# Patient Record
Sex: MALE | Race: WHITE | NOT HISPANIC OR LATINO | Employment: UNEMPLOYED | URBAN - METROPOLITAN AREA
[De-identification: names, ages, dates, MRNs, and addresses within clinical notes are randomized per-mention and may not be internally consistent; named-entity substitution may affect disease eponyms.]

---

## 2017-01-20 ENCOUNTER — OFFICE VISIT (OUTPATIENT)
Dept: FAMILY MEDICINE CLINIC | Facility: CLINIC | Age: 42
End: 2017-01-20

## 2017-01-20 VITALS
TEMPERATURE: 98 F | BODY MASS INDEX: 26.49 KG/M2 | DIASTOLIC BLOOD PRESSURE: 74 MMHG | OXYGEN SATURATION: 98 % | HEART RATE: 87 BPM | WEIGHT: 189.2 LBS | SYSTOLIC BLOOD PRESSURE: 124 MMHG | HEIGHT: 71 IN

## 2017-01-20 DIAGNOSIS — K21.9 GASTROESOPHAGEAL REFLUX DISEASE, ESOPHAGITIS PRESENCE NOT SPECIFIED: Primary | ICD-10-CM

## 2017-01-20 DIAGNOSIS — R07.9 CHEST PAIN, UNSPECIFIED TYPE: ICD-10-CM

## 2017-01-20 PROCEDURE — 93000 ELECTROCARDIOGRAM COMPLETE: CPT | Performed by: NURSE PRACTITIONER

## 2017-01-20 PROCEDURE — 99213 OFFICE O/P EST LOW 20 MIN: CPT | Performed by: NURSE PRACTITIONER

## 2017-01-20 RX ORDER — PANTOPRAZOLE SODIUM 40 MG/1
40 TABLET, DELAYED RELEASE ORAL DAILY
Qty: 30 TABLET | Refills: 2 | Status: SHIPPED | OUTPATIENT
Start: 2017-01-20 | End: 2018-01-16 | Stop reason: SDUPTHER

## 2017-01-20 NOTE — MR AVS SNAPSHOT
Raimundo Allison   1/20/2017 1:40 PM   Office Visit    Dept Phone:  [phone]   Encounter #:  [id number]    Provider:  WING Hansen   Department:  [facility] INTERNAL MEDICINE                Your Full Care Plan              Today's Medication Changes          These changes are accurate as of: 1/20/17  2:19 PM.  If you have any questions, ask your nurse or doctor.               New Medication(s)Ordered:     pantoprazole 40 MG EC tablet   Commonly known as:  PROTONIX   Take 1 tablet by mouth Daily.   Started by:  WING Hansen         Stop taking medication(s)listed here:     esomeprazole 20 MG capsule   Commonly known as:  nexIUM   Stopped by:  WING Hansen                Where to Get Your Medications      These medications were sent to [facility] [id number] - [facility] [id number] [place] [address] [address] [facility] [address] & [facility] [address] - [phone]  - [phone]   [address] [facility] [address], [facility] [address]     Phone:  [phone]     pantoprazole 40 MG EC tablet                  Your Updated Medication List          This list is accurate as of: 1/20/17  2:19 PM.  Always use your most recent med list.                atorvastatin 10 MG tablet   Commonly known as:  LIPITOR   Take 1 tablet by mouth Daily.       pantoprazole 40 MG EC tablet   Commonly known as:  PROTONIX   Take 1 tablet by mouth Daily.               You Were Diagnosed With        Codes Comments    Gastroesophageal reflux disease, esophagitis presence not specified    -  Primary ICD-10-CM: K21.9  ICD-9-CM: 530.81       Instructions     None    Patient Instructions History      Upcoming Appointments     Visit Type Date Time Department    OFFICE VISIT 1/20/2017  1:40 PM [facility] [facility]    [facility] 5/25/2017 12:00 PM [place]    OFFICE VISIT 6/1/2017  2:00 PM [facility]      MyChart Signup     Our records indicate that you have declined [facility]  "Health MyChart signup. If you would like to sign up for [facility], please email [email or web address] or call [phone] to obtain an activation code.             Other Info from Your Visit           Your Appointments     May 25, 2017 12:00 PM EDT   [facility] with LABJA KGTERRENCE   [facility] INTERNAL MEDICINE (--)    [address] [place], [place] [id number]   [phone]            Jun 01, 2017  2:00 PM EDT   Office Visit with Bart Torres MD   [facility] INTERNAL MEDICINE (--)    [address] [place], [place] [address]   [phone]           Arrive 15 minutes prior to appointment.              Allergies     No Known Drug Allergy        Reason for Visit     Chest Pain and pressure in center of chest. Has previously had this problem      Vital Signs     Blood Pressure Pulse Temperature Height Weight Oxygen Saturation    124/74 (BP Location: Left arm, Patient Position: Sitting, Cuff Size: Adult) 87 98 °F (36.7 °C) (Oral) 71\" (180.3 cm) 189 lb 3.2 oz (85.8 kg) 98%    Body Mass Index Smoking Status                26.39 kg/m2 Former Smoker          Problems and Diagnoses Noted     Acid reflux disease        "

## 2017-01-20 NOTE — PROGRESS NOTES
Subjective   Raimundo Allison is a 42 y.o. male who presents today for:    Chest Pain (and pressure in center of chest. Has previously had this problem)    HPI Comments: Mr. Allison presents today with c/o pain in the upper left center of his chest x 2 weeks. States he has had a history of this same pain off and on for several years. He was worked up in the ER 6 months ago for this pain and cardiac issues were ruled out. He denies shortness of breath, palpitation, cough, injury to his chest or URI symptoms. He also had a lower GI work up last September. He does have a history of GERD and is taking Nexium 20 mg daily. He is concerned that there is something wrong with his lungs because he noticed he had this pain while he was smoking. The pain did resolve after he quit smoking and now it is back.      I have reviewed the patient's medical history in detail and updated the computerized patient record.    Mr. Allison  reports that he has quit smoking. His smoking use included Cigars and Cigarettes. He has never used smokeless tobacco. He reports that he drinks about 7.2 oz of alcohol per week  He reports that he does not use illicit drugs.         Current Outpatient Prescriptions:   •  atorvastatin (LIPITOR) 10 MG tablet, Take 1 tablet by mouth Daily., Disp: 90 tablet, Rfl: 3  •  pantoprazole (PROTONIX) 40 MG EC tablet, Take 1 tablet by mouth Daily., Disp: 30 tablet, Rfl: 2      The following portions of the patient's history were reviewed and updated as appropriate: allergies, current medications, past social history and problem list.    Review of Systems   Constitutional: Negative.    Respiratory: Negative.    Cardiovascular: Positive for chest pain (see HPI).   Gastrointestinal: Negative.  Negative for nausea.   Musculoskeletal: Negative.    Skin: Negative.    Neurological: Negative.          Objective   Vitals:    01/20/17 1338   BP: 124/74   BP Location: Left arm   Patient Position: Sitting   Cuff Size: Adult   Pulse:  "87   Temp: 98 °F (36.7 °C)   TempSrc: Oral   SpO2: 98%   Weight: 189 lb 3.2 oz (85.8 kg)   Height: 71\" (180.3 cm)     Physical Exam   Constitutional: He is oriented to person, place, and time. He appears well-developed and well-nourished.   Cardiovascular: Normal rate, regular rhythm, normal heart sounds and intact distal pulses.    Pulmonary/Chest: Effort normal and breath sounds normal. No respiratory distress. He has no wheezes. He exhibits tenderness (located at the 4th to 5th intercostal space. around the sternal angle).   Neurological: He is alert and oriented to person, place, and time.   Skin: Skin is warm and dry.   Psychiatric:   No acute distress   Vitals reviewed.        Assessment/Plan   Raimundo was seen today for chest pain.    Diagnoses and all orders for this visit:    Gastroesophageal reflux disease, esophagitis presence not specified  -     pantoprazole (PROTONIX) 40 MG EC tablet; Take 1 tablet by mouth Daily.    Chest pain, unspecified type  -     ECG 12 Lead    1. I have reviewed his EKG tracing which shows normal sinus rhythm. I have no other EKG tracings to compare it to.   2. I have discontinue the Nexium and started Pantoprazole 40 mg daily.   3. He is to follow up as needed.  "

## 2017-01-25 ENCOUNTER — OFFICE VISIT (OUTPATIENT)
Dept: FAMILY MEDICINE CLINIC | Facility: CLINIC | Age: 42
End: 2017-01-25

## 2017-01-25 VITALS
HEART RATE: 83 BPM | DIASTOLIC BLOOD PRESSURE: 82 MMHG | OXYGEN SATURATION: 99 % | WEIGHT: 190 LBS | SYSTOLIC BLOOD PRESSURE: 116 MMHG | HEIGHT: 71 IN | BODY MASS INDEX: 26.6 KG/M2

## 2017-01-25 DIAGNOSIS — N41.0 ACUTE PROSTATITIS: ICD-10-CM

## 2017-01-25 DIAGNOSIS — K21.9 GASTROESOPHAGEAL REFLUX DISEASE, ESOPHAGITIS PRESENCE NOT SPECIFIED: Primary | ICD-10-CM

## 2017-01-25 DIAGNOSIS — R19.4 CHANGE IN BOWEL HABITS: ICD-10-CM

## 2017-01-25 PROCEDURE — 99214 OFFICE O/P EST MOD 30 MIN: CPT | Performed by: INTERNAL MEDICINE

## 2017-01-25 RX ORDER — CIPROFLOXACIN 500 MG/1
500 TABLET, FILM COATED ORAL 2 TIMES DAILY
Qty: 28 TABLET | Refills: 0 | Status: SHIPPED | OUTPATIENT
Start: 2017-01-25 | End: 2017-04-12

## 2017-01-26 LAB
ALBUMIN SERPL-MCNC: 4.8 G/DL (ref 3.5–5.2)
ALBUMIN/GLOB SERPL: 1.7 G/DL
ALP SERPL-CCNC: 86 U/L (ref 39–117)
ALT SERPL-CCNC: 35 U/L (ref 1–41)
APPEARANCE UR: CLEAR
AST SERPL-CCNC: 27 U/L (ref 1–40)
BACTERIA #/AREA URNS HPF: NORMAL /HPF
BASOPHILS # BLD AUTO: 0.05 10*3/MM3 (ref 0–0.2)
BASOPHILS NFR BLD AUTO: 0.7 % (ref 0–1.5)
BILIRUB SERPL-MCNC: 0.6 MG/DL (ref 0.1–1.2)
BILIRUB UR QL STRIP: NEGATIVE
BUN SERPL-MCNC: 11 MG/DL (ref 6–20)
BUN/CREAT SERPL: 10.8 (ref 7–25)
CALCIUM SERPL-MCNC: 9.9 MG/DL (ref 8.6–10.5)
CHLORIDE SERPL-SCNC: 102 MMOL/L (ref 98–107)
CO2 SERPL-SCNC: 27.3 MMOL/L (ref 22–29)
COLOR UR: YELLOW
CREAT SERPL-MCNC: 1.02 MG/DL (ref 0.76–1.27)
ENDOMYSIUM IGA SER QL: NEGATIVE
EOSINOPHIL # BLD AUTO: 0.48 10*3/MM3 (ref 0–0.7)
EOSINOPHIL NFR BLD AUTO: 6.8 % (ref 0.3–6.2)
EPI CELLS #/AREA URNS HPF: NORMAL /HPF
ERYTHROCYTE [DISTWIDTH] IN BLOOD BY AUTOMATED COUNT: 12.5 % (ref 11.5–14.5)
GLIADIN PEPTIDE IGA SER-ACNC: 4 UNITS (ref 0–19)
GLIADIN PEPTIDE IGG SER-ACNC: 2 UNITS (ref 0–19)
GLOBULIN SER CALC-MCNC: 2.8 GM/DL
GLUCOSE SERPL-MCNC: 107 MG/DL (ref 65–99)
GLUCOSE UR QL: NEGATIVE
H PYLORI IGG SER IA-ACNC: <0.9 U/ML (ref 0–0.8)
HCT VFR BLD AUTO: 47.1 % (ref 40.4–52.2)
HGB BLD-MCNC: 15.3 G/DL (ref 13.7–17.6)
HGB UR QL STRIP: NEGATIVE
IGA SERPL-MCNC: 178 MG/DL (ref 90–386)
IMM GRANULOCYTES # BLD: 0 10*3/MM3 (ref 0–0.03)
IMM GRANULOCYTES NFR BLD: 0 % (ref 0–0.5)
IRON SATN MFR SERPL: 30 % (ref 20–50)
IRON SERPL-MCNC: 119 MCG/DL (ref 59–158)
KETONES UR QL STRIP: NEGATIVE
LEUKOCYTE ESTERASE UR QL STRIP: NEGATIVE
LYMPHOCYTES # BLD AUTO: 1.92 10*3/MM3 (ref 0.9–4.8)
LYMPHOCYTES NFR BLD AUTO: 27.3 % (ref 19.6–45.3)
MCH RBC QN AUTO: 30.1 PG (ref 27–32.7)
MCHC RBC AUTO-ENTMCNC: 32.5 G/DL (ref 32.6–36.4)
MCV RBC AUTO: 92.5 FL (ref 79.8–96.2)
MICRO URNS: NORMAL
MICRO URNS: NORMAL
MONOCYTES # BLD AUTO: 0.6 10*3/MM3 (ref 0.2–1.2)
MONOCYTES NFR BLD AUTO: 8.5 % (ref 5–12)
MUCOUS THREADS URNS QL MICRO: PRESENT /HPF
NEUTROPHILS # BLD AUTO: 3.98 10*3/MM3 (ref 1.9–8.1)
NEUTROPHILS NFR BLD AUTO: 56.7 % (ref 42.7–76)
NITRITE UR QL STRIP: NEGATIVE
NRBC BLD AUTO-RTO: 0 /100 WBC (ref 0–0)
PH UR STRIP: 7.5 [PH] (ref 5–7.5)
PLATELET # BLD AUTO: 330 10*3/MM3 (ref 140–500)
POTASSIUM SERPL-SCNC: 4.5 MMOL/L (ref 3.5–5.2)
PROT SERPL-MCNC: 7.6 G/DL (ref 6–8.5)
PROT UR QL STRIP: NEGATIVE
PSA SERPL-MCNC: 0.67 NG/ML (ref 0–4)
RBC # BLD AUTO: 5.09 10*6/MM3 (ref 4.6–6)
RBC #/AREA URNS HPF: NORMAL /HPF
SODIUM SERPL-SCNC: 142 MMOL/L (ref 136–145)
SP GR UR: 1.01 (ref 1–1.03)
TIBC SERPL-MCNC: 400 MCG/DL
TSH SERPL DL<=0.005 MIU/L-ACNC: 0.97 MIU/ML (ref 0.27–4.2)
TTG IGA SER-ACNC: <2 U/ML (ref 0–3)
TTG IGG SER-ACNC: <2 U/ML (ref 0–5)
UIBC SERPL-MCNC: 281 MCG/DL
URINALYSIS REFLEX: NORMAL
UROBILINOGEN UR STRIP-MCNC: 0.2 MG/DL (ref 0.2–1)
VIT B12 SERPL-MCNC: 550 PG/ML (ref 211–946)
WBC # BLD AUTO: 7.03 10*3/MM3 (ref 4.5–10.7)
WBC #/AREA URNS HPF: NORMAL /HPF

## 2017-01-27 LAB
C TRACH RRNA SPEC QL NAA+PROBE: NEGATIVE
N GONORRHOEA RRNA SPEC QL NAA+PROBE: NEGATIVE

## 2017-04-12 ENCOUNTER — OFFICE VISIT (OUTPATIENT)
Dept: FAMILY MEDICINE CLINIC | Facility: CLINIC | Age: 42
End: 2017-04-12

## 2017-04-12 VITALS
HEART RATE: 79 BPM | DIASTOLIC BLOOD PRESSURE: 68 MMHG | OXYGEN SATURATION: 98 % | BODY MASS INDEX: 26.32 KG/M2 | HEIGHT: 71 IN | TEMPERATURE: 97.8 F | SYSTOLIC BLOOD PRESSURE: 110 MMHG | WEIGHT: 188 LBS

## 2017-04-12 DIAGNOSIS — R07.81 RIB PAIN ON RIGHT SIDE: Primary | ICD-10-CM

## 2017-04-12 PROCEDURE — 96372 THER/PROPH/DIAG INJ SC/IM: CPT | Performed by: NURSE PRACTITIONER

## 2017-04-12 PROCEDURE — 99213 OFFICE O/P EST LOW 20 MIN: CPT | Performed by: NURSE PRACTITIONER

## 2017-04-12 RX ORDER — KETOROLAC TROMETHAMINE 10 MG/1
10 TABLET, FILM COATED ORAL EVERY 6 HOURS PRN
Qty: 40 TABLET | Refills: 1 | Status: SHIPPED | OUTPATIENT
Start: 2017-04-12 | End: 2017-10-02

## 2017-04-12 NOTE — PROGRESS NOTES
"Subjective   Raimundo Allison is a 42 y.o. male who presents today for:    Rib Injury (2 days ago leaning over pool and heard something snap)    HPI Comments: Mr. Allison presents today because he pulled the muscles/cartilage on the right side of his ribs 2 days ago. He states he was working on his pool and when he reached over the side of the pool to  an object he heard a \"pop\". He denies shortness of breath. States he cannot take a deep breath without pain and he cannot sleep at night because of the pain. He has been taking Tylenol for the pain. He rates the pain as a 8/10 at times.         Mr. Allison  reports that he has quit smoking. His smoking use included Cigars and Cigarettes. He has never used smokeless tobacco. He reports that he drinks about 7.2 oz of alcohol per week  He reports that he does not use illicit drugs.         Current Outpatient Prescriptions:   •  atorvastatin (LIPITOR) 10 MG tablet, Take 1 tablet by mouth Daily., Disp: 90 tablet, Rfl: 3  •  pantoprazole (PROTONIX) 40 MG EC tablet, Take 1 tablet by mouth Daily., Disp: 30 tablet, Rfl: 2      The following portions of the patient's history were reviewed and updated as appropriate: allergies, current medications, past social history and problem list.    Review of Systems   Constitutional: Negative.    Respiratory: Negative for cough, chest tightness, shortness of breath, wheezing and stridor.         Pain with deep inspiration   Cardiovascular: Negative.    Musculoskeletal:        Right rib pain   Skin: Negative.    Neurological: Negative.    Hematological: Negative.          Objective   Vitals:    04/12/17 1531   BP: 110/68   BP Location: Left arm   Patient Position: Sitting   Cuff Size: Adult   Pulse: 79   Temp: 97.8 °F (36.6 °C)   TempSrc: Oral   SpO2: 98%   Weight: 188 lb (85.3 kg)   Height: 71\" (180.3 cm)     Physical Exam   Constitutional: He appears well-developed and well-nourished.   Pulmonary/Chest: Effort normal and breath sounds " normal. No respiratory distress. He has no wheezes. He has no rales. He exhibits tenderness (below right breast and to the side of his chest).   Musculoskeletal: He exhibits tenderness (over right ribs). He exhibits no edema.   Skin: Skin is warm and dry.   Psychiatric:   No acute distress   Vitals reviewed.        Assessment/Plan   Raimundo was seen today for rib injury.    Diagnoses and all orders for this visit:    Rib pain on right side  -     ketorolac (TORADOL) 10 MG tablet; Take 1 tablet by mouth Every 6 (Six) Hours As Needed for Moderate Pain (4-6).  -     ketorolac (TORADOL) injection 30 mg; Inject 1 mL into the shoulder, thigh, or buttocks 1 (One) Time.    Patient has receive Ketorolac 30 mg IM in the office today. He is to start Ketorolac 10 mg po every 6 hours as needed for pain. He has been instructed to go to the ER if his pain worsens or if he is having difficulty breathing. He has been instructed that this kind of rib pain could take weeks to resolve. He is to follow up as needed.

## 2017-04-27 ENCOUNTER — RESULTS ENCOUNTER (OUTPATIENT)
Dept: FAMILY MEDICINE CLINIC | Facility: CLINIC | Age: 42
End: 2017-04-27

## 2017-04-27 DIAGNOSIS — E78.00 ELEVATED CHOLESTEROL: ICD-10-CM

## 2017-10-02 ENCOUNTER — OFFICE VISIT (OUTPATIENT)
Dept: FAMILY MEDICINE CLINIC | Facility: CLINIC | Age: 42
End: 2017-10-02

## 2017-10-02 VITALS
BODY MASS INDEX: 25.86 KG/M2 | SYSTOLIC BLOOD PRESSURE: 112 MMHG | HEART RATE: 75 BPM | HEIGHT: 71 IN | OXYGEN SATURATION: 98 % | DIASTOLIC BLOOD PRESSURE: 72 MMHG | WEIGHT: 184.7 LBS

## 2017-10-02 DIAGNOSIS — R53.83 FATIGUE, UNSPECIFIED TYPE: ICD-10-CM

## 2017-10-02 DIAGNOSIS — E78.00 ELEVATED CHOLESTEROL: ICD-10-CM

## 2017-10-02 DIAGNOSIS — D68.2: ICD-10-CM

## 2017-10-02 DIAGNOSIS — R07.9 CHEST PAIN, UNSPECIFIED TYPE: Primary | ICD-10-CM

## 2017-10-02 PROCEDURE — 99214 OFFICE O/P EST MOD 30 MIN: CPT | Performed by: INTERNAL MEDICINE

## 2017-10-02 PROCEDURE — 93000 ELECTROCARDIOGRAM COMPLETE: CPT | Performed by: INTERNAL MEDICINE

## 2017-10-02 PROCEDURE — 71020 XR CHEST PA AND LATERAL: CPT | Performed by: INTERNAL MEDICINE

## 2017-10-02 RX ORDER — ATORVASTATIN CALCIUM 10 MG/1
10 TABLET, FILM COATED ORAL DAILY
Qty: 90 TABLET | Refills: 0 | Status: SHIPPED | OUTPATIENT
Start: 2017-10-02 | End: 2018-01-16 | Stop reason: SDUPTHER

## 2017-10-02 RX ORDER — ESOMEPRAZOLE MAGNESIUM 40 MG/1
40 CAPSULE, DELAYED RELEASE ORAL
COMMUNITY
End: 2019-03-12

## 2017-10-02 NOTE — PROGRESS NOTES
"Subjective   Raimundo Allison is a 42 y.o. male who presents today for:    Heaviness in Chest (x 3 weeks); Constipation (Pressure to go but not able to); Shortness of Breath; Fatigue; and Insomnia    History of Present Illness   Anterior chest pain and tenderness have been present for 3 weeks.  He has intermitent left side pain and SOA, not always associated with exertion, but usually worse with exertion.  Onset was relatively acute, without trauma.  He has tried OTC NSAIDs without benefit.    He has a strong family h/o VTE in multiple family members due to Factor V Leiden.  He also has Factor V Leiden mutation but no h/o VTE disease.    Mr. Allison  reports that he has quit smoking. His smoking use included Cigars and Cigarettes. He has never used smokeless tobacco. He reports that he drinks about 7.2 oz of alcohol per week  He reports that he does not use illicit drugs.     Allergies   Allergen Reactions   • No Known Drug Allergy        Current Outpatient Prescriptions:   •  esomeprazole (nexIUM) 40 MG capsule, Take 40 mg by mouth Every Morning Before Breakfast., Disp: , Rfl:   •  pantoprazole (PROTONIX) 40 MG EC tablet, Take 1 tablet by mouth Daily., Disp: 30 tablet, Rfl: 2  •  atorvastatin (LIPITOR) 10 MG tablet, Take 1 tablet by mouth Daily., Disp: 90 tablet, Rfl: 3      Review of Systems   Constitutional: Positive for fatigue. Negative for chills and fever.   Eyes: Negative for visual disturbance.   Respiratory: Positive for shortness of breath.    Cardiovascular: Positive for chest pain. Negative for palpitations and leg swelling.   Gastrointestinal: Negative for abdominal pain.   Hematological: Does not bruise/bleed easily.   Psychiatric/Behavioral: The patient is nervous/anxious.        Objective   Vitals:    10/02/17 1540   BP: 112/72   BP Location: Left arm   Patient Position: Sitting   Cuff Size: Adult   Pulse: 75   SpO2: 98%   Weight: 184 lb 11.2 oz (83.8 kg)   Height: 71\" (180.3 cm)     Physical " Exam  Well-developed, well-nourished, in no acute distress.  No cervical, supraclavicular, or axillary lymphadenopathy.  Clear to auscultation bilaterally with good breath sounds throughout the lung fields. Normal respiratory effort noted.  Regular rate and rhythm. Normal S1 and S2. No S3 or S4. No lift or heave appreciated. No murmur noted.  Abdomen is  nontender.  Lower extremities are free of edema and erythema.  Pedal pulses are full bilaterally. There is no Homans sign. There is no warmth or erythema of the lower extremities.    Assessment/Plan   Raimundo was seen today for heaviness in chest, constipation, shortness of breath, fatigue and insomnia.    Diagnoses and all orders for this visit:    Chest pain, unspecified type  -     ECG 12 Lead  -     XR Chest PA & Lateral  -     D-dimer, Quantitative  -     Comprehensive Metabolic Panel    AC globulin factor V (labile) deficiency  -     D-dimer, Quantitative    Elevated cholesterol  -     atorvastatin (LIPITOR) 10 MG tablet; Take 1 tablet by mouth Daily.    Fatigue, unspecified type  -     TSH Rfx On Abnormal To Free T4  -     CBC & Differential  -     Comprehensive Metabolic Panel    Evaluate as ordered. Chest x-ray shows no active disease, no cardiomegaly, and no effusions. ECG shows sinus rhythm with sinus arrhythmia. Otherwise it is normal. Sinus arrhythmia is new, otherwise it is unchanged from an ECG done 1/10/2017. We will obtain a CT scan of the chest if the d-dimer is elevated.    He'll need to return, fasting, for the cholesterol levels.    We will do additional evaluation for his fatigue as ordered today. Additional recommendations will follow after we see those lab results.

## 2017-10-03 LAB
ALBUMIN SERPL-MCNC: 4.5 G/DL (ref 3.5–5.5)
ALBUMIN/GLOB SERPL: 1.9 {RATIO} (ref 1.2–2.2)
ALP SERPL-CCNC: 74 IU/L (ref 39–117)
ALT SERPL-CCNC: 19 IU/L (ref 0–44)
AST SERPL-CCNC: 24 IU/L (ref 0–40)
BASOPHILS # BLD AUTO: 0 X10E3/UL (ref 0–0.2)
BASOPHILS NFR BLD AUTO: 1 %
BILIRUB SERPL-MCNC: 0.2 MG/DL (ref 0–1.2)
BUN SERPL-MCNC: 15 MG/DL (ref 6–24)
BUN/CREAT SERPL: 14 (ref 9–20)
CALCIUM SERPL-MCNC: 9.1 MG/DL (ref 8.7–10.2)
CHLORIDE SERPL-SCNC: 100 MMOL/L (ref 96–106)
CO2 SERPL-SCNC: 26 MMOL/L (ref 18–29)
CREAT SERPL-MCNC: 1.1 MG/DL (ref 0.76–1.27)
D DIMER PPP FEU-MCNC: 0.64 MG/L FEU (ref 0–0.49)
EOSINOPHIL # BLD AUTO: 0.5 X10E3/UL (ref 0–0.4)
EOSINOPHIL NFR BLD AUTO: 6 %
ERYTHROCYTE [DISTWIDTH] IN BLOOD BY AUTOMATED COUNT: 13 % (ref 12.3–15.4)
GLOBULIN SER CALC-MCNC: 2.4 G/DL (ref 1.5–4.5)
GLUCOSE SERPL-MCNC: 107 MG/DL (ref 65–99)
HCT VFR BLD AUTO: 41.7 % (ref 37.5–51)
HGB BLD-MCNC: 14.4 G/DL (ref 12.6–17.7)
IMM GRANULOCYTES # BLD: 0 X10E3/UL (ref 0–0.1)
IMM GRANULOCYTES NFR BLD: 0 %
LYMPHOCYTES # BLD AUTO: 2.3 X10E3/UL (ref 0.7–3.1)
LYMPHOCYTES NFR BLD AUTO: 29 %
MCH RBC QN AUTO: 30.1 PG (ref 26.6–33)
MCHC RBC AUTO-ENTMCNC: 34.5 G/DL (ref 31.5–35.7)
MCV RBC AUTO: 87 FL (ref 79–97)
MONOCYTES # BLD AUTO: 0.8 X10E3/UL (ref 0.1–0.9)
MONOCYTES NFR BLD AUTO: 10 %
NEUTROPHILS # BLD AUTO: 4.4 X10E3/UL (ref 1.4–7)
NEUTROPHILS NFR BLD AUTO: 54 %
PLATELET # BLD AUTO: 328 X10E3/UL (ref 150–379)
POTASSIUM SERPL-SCNC: 4.6 MMOL/L (ref 3.5–5.2)
PROT SERPL-MCNC: 6.9 G/DL (ref 6–8.5)
RBC # BLD AUTO: 4.79 X10E6/UL (ref 4.14–5.8)
SODIUM SERPL-SCNC: 143 MMOL/L (ref 134–144)
TSH SERPL DL<=0.005 MIU/L-ACNC: 0.88 UIU/ML (ref 0.45–4.5)
WBC # BLD AUTO: 8.1 X10E3/UL (ref 3.4–10.8)

## 2017-10-05 ENCOUNTER — HOSPITAL ENCOUNTER (OUTPATIENT)
Dept: CT IMAGING | Facility: HOSPITAL | Age: 42
Discharge: HOME | End: 2017-10-05
Attending: INTERNAL MEDICINE | Admitting: INTERNAL MEDICINE

## 2017-10-05 DIAGNOSIS — R07.9 CHEST PAIN, UNSPECIFIED TYPE: Primary | ICD-10-CM

## 2017-10-05 DIAGNOSIS — D68.2: ICD-10-CM

## 2017-10-05 PROCEDURE — 71275 CT ANGIOGRAPHY CHEST: CPT

## 2017-10-05 PROCEDURE — 0 IOPAMIDOL PER 1 ML: Performed by: INTERNAL MEDICINE

## 2017-10-05 RX ADMIN — IOPAMIDOL 90 ML: 755 INJECTION, SOLUTION INTRAVENOUS at 12:25

## 2017-10-11 DIAGNOSIS — M94.0 COSTOCHONDRITIS: ICD-10-CM

## 2017-10-13 ENCOUNTER — OFFICE VISIT (OUTPATIENT)
Dept: FAMILY MEDICINE CLINIC | Facility: CLINIC | Age: 42
End: 2017-10-13

## 2017-10-13 VITALS
WEIGHT: 187 LBS | OXYGEN SATURATION: 98 % | HEIGHT: 71 IN | BODY MASS INDEX: 26.18 KG/M2 | SYSTOLIC BLOOD PRESSURE: 126 MMHG | DIASTOLIC BLOOD PRESSURE: 74 MMHG | HEART RATE: 86 BPM

## 2017-10-13 DIAGNOSIS — Z00.00 ROUTINE GENERAL MEDICAL EXAMINATION AT HEALTH CARE FACILITY: ICD-10-CM

## 2017-10-13 DIAGNOSIS — K21.9 GASTROESOPHAGEAL REFLUX DISEASE, ESOPHAGITIS PRESENCE NOT SPECIFIED: ICD-10-CM

## 2017-10-13 DIAGNOSIS — M94.0 COSTOCHONDRITIS: Primary | ICD-10-CM

## 2017-10-13 DIAGNOSIS — D68.2: ICD-10-CM

## 2017-10-13 PROBLEM — Z82.49 FAMILY HISTORY OF HEART DISEASE: Status: ACTIVE | Noted: 2017-10-13

## 2017-10-13 PROCEDURE — 99215 OFFICE O/P EST HI 40 MIN: CPT | Performed by: INTERNAL MEDICINE

## 2017-10-13 RX ORDER — MELOXICAM 15 MG/1
15 TABLET ORAL DAILY
Qty: 30 TABLET | Refills: 1 | Status: SHIPPED | OUTPATIENT
Start: 2017-10-13 | End: 2017-11-15

## 2017-10-13 NOTE — PROGRESS NOTES
Subjective   Raimundo Allison is a 42 y.o. male who presents today for:    Chest Pain (Discuss )    History of Present Illness     Relatively acute onset of left-sided chest pain several weeks ago was evaluated with laboratory studies initially showed a mildly elevated d-dimer 0.64).  This was followed by a CT scan which showed no evidence of pulmonary embolus.    There is a strong family history of venous thromboembolic disease attributed to factor V Leiden deficiency.  The patient has tested positive for factor V Leiden, also.  However, he does not know if he is homozygous or heterozygous.  He has had no previous venous thrombosis or pulmonary embolus.    He has been evaluated for similar symptoms in the past and treated with nonsteroidal anti-inflammatories.  Next    He presents with his wife today to discuss recent findings and their concerns.    Mr. Allison  reports that he has quit smoking. His smoking use included Cigars and Cigarettes. He has never used smokeless tobacco. He reports that he drinks about 7.2 oz of alcohol per week  He reports that he does not use illicit drugs.     Allergies   Allergen Reactions   • No Known Drug Allergy        Current Outpatient Prescriptions:   •  atorvastatin (LIPITOR) 10 MG tablet, Take 1 tablet by mouth Daily., Disp: 90 tablet, Rfl: 0  •  esomeprazole (nexIUM) 40 MG capsule, Take 40 mg by mouth Every Morning Before Breakfast., Disp: , Rfl:   •  pantoprazole (PROTONIX) 40 MG EC tablet, Take 1 tablet by mouth Daily., Disp: 30 tablet, Rfl: 2  •  meloxicam (MOBIC) 15 MG tablet, Take 1 tablet by mouth Daily., Disp: 30 tablet, Rfl: 1      Review of Systems  Chest pain persists.  It is worse with movement and occasionally with deep inspiration.  No palpitations.  No nausea or vomiting.  No diaphoresis.  No fevers chills or sweats.  No lower extremity swelling.    Objective   Vitals:    10/13/17 1321   BP: 126/74   BP Location: Right arm   Patient Position: Sitting   Cuff Size:  "Adult   Pulse: 86   SpO2: 98%   Weight: 187 lb (84.8 kg)   Height: 71\" (180.3 cm)     Physical Exam  Well-developed, well-nourished.  In no acute distress.  Normal respiratory effort.  Alert and oriented ×4.  Answers all questions appropriately.    Assessment/Plan   Raimundo was seen today for chest pain.    Diagnoses and all orders for this visit:    Costochondritis  -     meloxicam (MOBIC) 15 MG tablet; Take 1 tablet by mouth Daily.    AC globulin factor V (labile) deficiency    Gastroesophageal reflux disease, esophagitis presence not specified    45 minutes of the 45 minute office visit were spent discussing the results of the labs, the CT scan, prior evaluations, the variable risk of patients with factor V Leiden genetic mutations, and multiple other issues regarding venous thromboembolic disease.  Multiple questions from the patient and his wife were answered over the course of this 45 minute visit.  Every attempt was made to reassure him that no further evaluation was indicated at this time.  At some point, we may want to repeat genetic testing in order to better define his risk for venous thromboembolic disease.    Recommendations made at this time included continued avoidance of cigarettes; aspirin therapy if prolonged immobilization (long road trips or flights, hospitalization, etc.); and notification of other physicians involved in his care should surgical procedures.  Indicated for her any reason.  The increased risk of venous thrombosis in the face of anesthesia was discussed.    The need for continued PPI therapy while we are treating presumed costochondritis with the anti-inflammatory was also discussed.  "

## 2017-10-20 DIAGNOSIS — R07.9 CHEST PAIN, UNSPECIFIED TYPE: Primary | ICD-10-CM

## 2017-11-15 ENCOUNTER — OFFICE VISIT (OUTPATIENT)
Dept: CARDIOLOGY | Facility: CLINIC | Age: 42
End: 2017-11-15

## 2017-11-15 VITALS
HEART RATE: 78 BPM | HEIGHT: 71 IN | BODY MASS INDEX: 26.32 KG/M2 | SYSTOLIC BLOOD PRESSURE: 118 MMHG | DIASTOLIC BLOOD PRESSURE: 76 MMHG | WEIGHT: 188 LBS

## 2017-11-15 DIAGNOSIS — R06.09 DYSPNEA ON EXERTION: ICD-10-CM

## 2017-11-15 DIAGNOSIS — R94.31 ABNORMAL EKG: ICD-10-CM

## 2017-11-15 DIAGNOSIS — R07.9 CHEST PAIN, UNSPECIFIED TYPE: Primary | ICD-10-CM

## 2017-11-15 DIAGNOSIS — Z82.49 FAMILY HISTORY OF HEART DISEASE: ICD-10-CM

## 2017-11-15 DIAGNOSIS — D68.2: ICD-10-CM

## 2017-11-15 PROCEDURE — 93000 ELECTROCARDIOGRAM COMPLETE: CPT | Performed by: INTERNAL MEDICINE

## 2017-11-15 PROCEDURE — 99204 OFFICE O/P NEW MOD 45 MIN: CPT | Performed by: INTERNAL MEDICINE

## 2017-11-15 NOTE — PROGRESS NOTES
Subjective:     Encounter Date:11/15/2017      Patient ID: Raimundo Allison is a 42 y.o. male.    Chief Complaint:  History of Present Illness    This is a 42-year-old male with a history of acid reflux, factor V Leiden, seasonal allergies, who presents for evaluation of chest discomfort and dyspnea on exertion.    The patient reports that about a month ago he had an episode while he was standing in his bedroom watching TV of sudden onset chest heaviness.  The initial episode lasted about 5 minutes and then started to dissipate when it recurred again.  After that he feels like his chest was not felt the same way since.  He has constant discomfort in the left side of his chest radiating to his left axilla and around to his back.  It does not worsen with exertion.  He cannot think of any alleviating factors.  He also reports over the last couple months that he's noticed more dyspnea and fatigue with exertion.  Normal activities that would normally not make him short of breath have made him more out of breath recently.  Due to his history of factor V Leiden deficiency he underwent a d-dimer that was mildly abnormal.  He subsequently underwent a CT angiogram of the chest was performed on 10/5/2017 that showed no evidence of pulmonary embolism, no evidence of aortic aneurysm or dissection, and no acute pulmonary process.  The only comment on the heart was that this size appeared to be within normal limits.  He does report a significant family history of coronary artery disease including his father who had his first MI in his 50s and a paternal grandmother who  of this MI and CHF at the age of 60.  He admits that he has not been very active with routine exercise lately although they did spend a lot of time doing outdoor activities over the summer.    Review of Systems   Constitution: Positive for malaise/fatigue. Negative for weakness.   HENT: Negative for hearing loss, hoarse voice, nosebleeds and sore throat.     Eyes: Negative for pain.   Cardiovascular: Positive for chest pain and dyspnea on exertion. Negative for claudication, cyanosis, irregular heartbeat, leg swelling, near-syncope, orthopnea, palpitations, paroxysmal nocturnal dyspnea and syncope.   Respiratory: Negative for shortness of breath and snoring.    Endocrine: Negative for cold intolerance, heat intolerance, polydipsia, polyphagia and polyuria.   Skin: Negative for itching and rash.   Musculoskeletal: Negative for arthritis, falls, joint pain, joint swelling, muscle cramps, muscle weakness and myalgias.   Gastrointestinal: Negative for constipation, diarrhea, dysphagia, heartburn, hematemesis, hematochezia, melena, nausea and vomiting.   Genitourinary: Negative for frequency, hematuria and hesitancy.   Neurological: Negative for excessive daytime sleepiness, dizziness, headaches, light-headedness and numbness.   Psychiatric/Behavioral: Negative for depression. The patient is not nervous/anxious.           Current Outpatient Prescriptions:   •  atorvastatin (LIPITOR) 10 MG tablet, Take 1 tablet by mouth Daily., Disp: 90 tablet, Rfl: 0  •  esomeprazole (nexIUM) 40 MG capsule, Take 40 mg by mouth Every Morning Before Breakfast., Disp: , Rfl:   •  pantoprazole (PROTONIX) 40 MG EC tablet, Take 1 tablet by mouth Daily., Disp: 30 tablet, Rfl: 2    Past Medical History:   Diagnosis Date   • AC globulin factor V (labile) deficiency    • Chest pain    • Costochondritis    • Factor 5 Leiden mutation, heterozygous    • Family history of heart disease 10/13/2017    Father   • GERD (gastroesophageal reflux disease)    • GERD (gastroesophageal reflux disease)    • High cholesterol    • Hyperlipidemia      Past Surgical History:   Procedure Laterality Date   • COLONOSCOPY N/A 9/23/2016    Procedure: COLONOSCOPYto cecum and ti with hot snare spot ink and 2 clips;  Surgeon: Grey Awan MD;  Location: [facility] ENDOSCOPY;  Service:    • SHOULDER ACROMIOPLASTY Bilateral    •  "SHOULDER ARTHROSCOPY  2015    ALISON Holland MD     Family History   Problem Relation Age of Onset   • Colon polyps Mother    • Factor V Leiden deficiency Father    • Heart attack Father    • Factor V Leiden deficiency Sister    • Factor V Leiden deficiency Brother    • Colon cancer Maternal Uncle      late 50s     Social History   Substance Use Topics   • Smoking status: Former Smoker     Types: Cigars, Cigarettes   • Smokeless tobacco: Never Used   • Alcohol use 7.2 oz/week     12 Standard drinks or equivalent per week           ECG 12 Lead  Date/Time: 11/15/2017 4:46 PM  Performed by: ANA BROOKS  Authorized by: ANA BROOKS   Comparison: compared with previous ECG   Similar to previous ECG  Comparison to previous ECG: Lateral T wave inversions appear to be new  Rhythm: sinus rhythm  Comments: Lateral T wave inversions               Objective:         Visit Vitals   • /76 (BP Location: Right arm, Patient Position: Sitting)  Comment: lft 114 76   • Pulse 78   • Ht 71\" (180.3 cm)   • Wt 188 lb (85.3 kg)   • BMI 26.22 kg/m2          Physical Exam   Constitutional: He is oriented to person, place, and time. He appears well-developed and well-nourished.   HENT:   Head: Normocephalic and atraumatic.   Eyes: Conjunctivae, EOM and lids are normal. Pupils are equal, round, and reactive to light.   Neck: Normal range of motion and full passive range of motion without pain. Neck supple. No JVD present. Carotid bruit is not present.   Cardiovascular: Normal rate, regular rhythm, S1 normal and S2 normal.  Exam reveals no gallop.    No murmur heard.  Pulses:       Radial pulses are 2+ on the right side, and 2+ on the left side.   No bilateral lower extremity edema   Pulmonary/Chest: Effort normal and breath sounds normal.   Abdominal: Soft. Normal appearance.   Lymphadenopathy:     He has no cervical adenopathy.   Neurological: He is alert and oriented to person, place, and time.   Skin: Skin is warm, dry and intact. "   Psychiatric: He has a normal mood and affect.       Lab Review:       Assessment:          Diagnosis Plan   1. Chest pain, unspecified type  Stress Test With Myocardial Perfusion    Adult Transthoracic Echo Complete W/ Cont if Necessary Per Protocol   2. AC globulin factor V (labile) deficiency     3. Family history of heart disease  Stress Test With Myocardial Perfusion    Adult Transthoracic Echo Complete W/ Cont if Necessary Per Protocol   4. Abnormal EKG  Stress Test With Myocardial Perfusion    Adult Transthoracic Echo Complete W/ Cont if Necessary Per Protocol   5. Dyspnea on exertion  Adult Transthoracic Echo Complete W/ Cont if Necessary Per Protocol          Plan:       1.  Chest discomfort.  The symptoms sound somewhat atypical but in light of his family history and his issues with dyspnea on exertion at think we should pursue an ischemic evaluation.  However he does have evidence of T-wave inversions in his lateral leads which appear to be new compared to prior EKG from October.  We'll get her set up with a treadmill Myoview stress test.  2.  Dyspnea on exertion.  This may even be an anginal equivalent.  Again stress test as above.  Also get an echocardiogram to evaluate for any structural heart disease.  3.  Family history of coronary artery disease  5.  Acid reflux    We'll call and discuss results of the stress test and the echocardiogram and determine further workup, management, and follow-up based on those results.

## 2017-11-29 ENCOUNTER — HOSPITAL ENCOUNTER (OUTPATIENT)
Dept: CARDIOLOGY | Facility: HOSPITAL | Age: 42
Discharge: HOME | End: 2017-11-29
Attending: INTERNAL MEDICINE

## 2017-11-29 ENCOUNTER — HOSPITAL ENCOUNTER (OUTPATIENT)
Dept: CARDIOLOGY | Facility: HOSPITAL | Age: 42
Discharge: HOME | End: 2017-11-29
Attending: INTERNAL MEDICINE | Admitting: INTERNAL MEDICINE

## 2017-11-29 VITALS
HEART RATE: 71 BPM | BODY MASS INDEX: 28.49 KG/M2 | HEIGHT: 68 IN | WEIGHT: 188 LBS | SYSTOLIC BLOOD PRESSURE: 118 MMHG | DIASTOLIC BLOOD PRESSURE: 90 MMHG

## 2017-11-29 LAB
ASCENDING AORTA: 3.1 CM
BH CV ECHO MEAS - ACS: 2.2 CM
BH CV ECHO MEAS - AO MAX PG (FULL): 3.1 MMHG
BH CV ECHO MEAS - AO MAX PG: 5.4 MMHG
BH CV ECHO MEAS - AO MEAN PG (FULL): 2 MMHG
BH CV ECHO MEAS - AO MEAN PG: 3.2 MMHG
BH CV ECHO MEAS - AO ROOT AREA (BSA CORRECTED): 1.4
BH CV ECHO MEAS - AO ROOT AREA: 6.7 CM^2
BH CV ECHO MEAS - AO ROOT DIAM: 2.9 CM
BH CV ECHO MEAS - AO V2 MAX: 116.2 CM/SEC
BH CV ECHO MEAS - AO V2 MEAN: 85.3 CM/SEC
BH CV ECHO MEAS - AO V2 VTI: 25.2 CM
BH CV ECHO MEAS - AVA(I,A): 2.4 CM^2
BH CV ECHO MEAS - AVA(I,D): 2.4 CM^2
BH CV ECHO MEAS - AVA(V,A): 2.4 CM^2
BH CV ECHO MEAS - AVA(V,D): 2.4 CM^2
BH CV ECHO MEAS - BSA(HAYCOCK): 2.1 M^2
BH CV ECHO MEAS - BSA: 2.1 M^2
BH CV ECHO MEAS - BZI_BMI: 26.2 KILOGRAMS/M^2
BH CV ECHO MEAS - BZI_METRIC_HEIGHT: 180.3 CM
BH CV ECHO MEAS - BZI_METRIC_WEIGHT: 85.3 KG
BH CV ECHO MEAS - CONTRAST EF (2CH): 58.8 ML/M^2
BH CV ECHO MEAS - CONTRAST EF 4CH: 54.8 ML/M^2
BH CV ECHO MEAS - EDV(MOD-SP2): 131 ML
BH CV ECHO MEAS - EDV(MOD-SP4): 73 ML
BH CV ECHO MEAS - EDV(TEICH): 123.3 ML
BH CV ECHO MEAS - EF(CUBED): 60.8 %
BH CV ECHO MEAS - EF(MOD-SP2): 58.8 %
BH CV ECHO MEAS - EF(MOD-SP4): 57 %
BH CV ECHO MEAS - EF(TEICH): 52 %
BH CV ECHO MEAS - ESV(MOD-SP2): 54 ML
BH CV ECHO MEAS - ESV(MOD-SP4): 33 ML
BH CV ECHO MEAS - ESV(TEICH): 59.1 ML
BH CV ECHO MEAS - FS: 26.8 %
BH CV ECHO MEAS - IVS/LVPW: 0.88
BH CV ECHO MEAS - IVSD: 0.73 CM
BH CV ECHO MEAS - LAT PEAK E' VEL: 5 CM/SEC
BH CV ECHO MEAS - LV DIASTOLIC VOL/BSA (35-75): 35.5 ML/M^2
BH CV ECHO MEAS - LV MASS(C)D: 136 GRAMS
BH CV ECHO MEAS - LV MASS(C)DI: 66.2 GRAMS/M^2
BH CV ECHO MEAS - LV MAX PG: 2.3 MMHG
BH CV ECHO MEAS - LV MEAN PG: 1.2 MMHG
BH CV ECHO MEAS - LV SYSTOLIC VOL/BSA (12-30): 16.1 ML/M^2
BH CV ECHO MEAS - LV V1 MAX: 75.3 CM/SEC
BH CV ECHO MEAS - LV V1 MEAN: 50.3 CM/SEC
BH CV ECHO MEAS - LV V1 VTI: 16.5 CM
BH CV ECHO MEAS - LVIDD: 5.1 CM
BH CV ECHO MEAS - LVIDS: 3.7 CM
BH CV ECHO MEAS - LVLD AP2: 8.6 CM
BH CV ECHO MEAS - LVLD AP4: 7.5 CM
BH CV ECHO MEAS - LVLS AP2: 7.4 CM
BH CV ECHO MEAS - LVLS AP4: 6.4 CM
BH CV ECHO MEAS - LVOT AREA (M): 3.8 CM^2
BH CV ECHO MEAS - LVOT AREA: 3.7 CM^2
BH CV ECHO MEAS - LVOT DIAM: 2.2 CM
BH CV ECHO MEAS - LVPWD: 0.83 CM
BH CV ECHO MEAS - MED PEAK E' VEL: 7 CM/SEC
BH CV ECHO MEAS - MV A DUR: 0.13 SEC
BH CV ECHO MEAS - MV A MAX VEL: 64.5 CM/SEC
BH CV ECHO MEAS - MV DEC SLOPE: 491.5 CM/SEC^2
BH CV ECHO MEAS - MV DEC TIME: 0.14 SEC
BH CV ECHO MEAS - MV E MAX VEL: 66.5 CM/SEC
BH CV ECHO MEAS - MV E/A: 1
BH CV ECHO MEAS - MV MAX PG: 3.5 MMHG
BH CV ECHO MEAS - MV MEAN PG: 1.5 MMHG
BH CV ECHO MEAS - MV P1/2T MAX VEL: 67.4 CM/SEC
BH CV ECHO MEAS - MV P1/2T: 40.2 MSEC
BH CV ECHO MEAS - MV V2 MAX: 93.7 CM/SEC
BH CV ECHO MEAS - MV V2 MEAN: 56.3 CM/SEC
BH CV ECHO MEAS - MV V2 VTI: 30.8 CM
BH CV ECHO MEAS - MVA P1/2T LCG: 3.3 CM^2
BH CV ECHO MEAS - MVA(P1/2T): 5.5 CM^2
BH CV ECHO MEAS - MVA(VTI): 2 CM^2
BH CV ECHO MEAS - PA ACC TIME: 0.11 SEC
BH CV ECHO MEAS - PA MAX PG (FULL): 2.8 MMHG
BH CV ECHO MEAS - PA MAX PG: 3.9 MMHG
BH CV ECHO MEAS - PA PR(ACCEL): 28.3 MMHG
BH CV ECHO MEAS - PA V2 MAX: 99.1 CM/SEC
BH CV ECHO MEAS - PULM A REVS DUR: 0.1 SEC
BH CV ECHO MEAS - PULM A REVS VEL: 25.7 CM/SEC
BH CV ECHO MEAS - PULM DIAS VEL: 45.1 CM/SEC
BH CV ECHO MEAS - PULM S/D: 0.82
BH CV ECHO MEAS - PULM SYS VEL: 36.9 CM/SEC
BH CV ECHO MEAS - PVA(V,A): 1.7 CM^2
BH CV ECHO MEAS - PVA(V,D): 1.7 CM^2
BH CV ECHO MEAS - QP/QS: 0.58
BH CV ECHO MEAS - RAP SYSTOLE: 3 MMHG
BH CV ECHO MEAS - RV MAX PG: 1.1 MMHG
BH CV ECHO MEAS - RV MEAN PG: 0.59 MMHG
BH CV ECHO MEAS - RV V1 MAX: 52.9 CM/SEC
BH CV ECHO MEAS - RV V1 MEAN: 35.4 CM/SEC
BH CV ECHO MEAS - RV V1 VTI: 11 CM
BH CV ECHO MEAS - RVOT AREA: 3.2 CM^2
BH CV ECHO MEAS - RVOT DIAM: 2 CM
BH CV ECHO MEAS - RVSP: 22 MMHG
BH CV ECHO MEAS - SI(AO): 82.2 ML/M^2
BH CV ECHO MEAS - SI(CUBED): 39 ML/M^2
BH CV ECHO MEAS - SI(LVOT): 29.7 ML/M^2
BH CV ECHO MEAS - SI(MOD-SP2): 37.5 ML/M^2
BH CV ECHO MEAS - SI(MOD-SP4): 19.5 ML/M^2
BH CV ECHO MEAS - SI(TEICH): 31.2 ML/M^2
BH CV ECHO MEAS - SV(AO): 168.8 ML
BH CV ECHO MEAS - SV(CUBED): 80.2 ML
BH CV ECHO MEAS - SV(LVOT): 61 ML
BH CV ECHO MEAS - SV(MOD-SP2): 77 ML
BH CV ECHO MEAS - SV(MOD-SP4): 40 ML
BH CV ECHO MEAS - SV(RVOT): 35.4 ML
BH CV ECHO MEAS - SV(TEICH): 64.2 ML
BH CV ECHO MEAS - TAPSE (>1.6): 1.9 CM2
BH CV ECHO MEAS - TR MAX VEL: 215.8 CM/SEC
BH CV NUCLEAR PRIOR STUDY: 2
BH CV STRESS BP STAGE 1: NORMAL
BH CV STRESS BP STAGE 2: NORMAL
BH CV STRESS BP STAGE 3: NORMAL
BH CV STRESS DURATION MIN STAGE 1: 3
BH CV STRESS DURATION MIN STAGE 2: 3
BH CV STRESS DURATION MIN STAGE 3: 3
BH CV STRESS DURATION MIN STAGE 4: 0
BH CV STRESS DURATION SEC STAGE 1: 0
BH CV STRESS DURATION SEC STAGE 2: 0
BH CV STRESS DURATION SEC STAGE 3: 0
BH CV STRESS DURATION SEC STAGE 4: 30
BH CV STRESS GRADE STAGE 1: 10
BH CV STRESS GRADE STAGE 2: 12
BH CV STRESS GRADE STAGE 3: 14
BH CV STRESS GRADE STAGE 4: 16
BH CV STRESS HR STAGE 1: 100
BH CV STRESS HR STAGE 2: 132
BH CV STRESS HR STAGE 3: 161
BH CV STRESS HR STAGE 4: 167
BH CV STRESS METS STAGE 1: 5
BH CV STRESS METS STAGE 2: 7.5
BH CV STRESS METS STAGE 3: 10
BH CV STRESS METS STAGE 4: 13.5
BH CV STRESS PROTOCOL 1: NORMAL
BH CV STRESS RECOVERY BP: NORMAL MMHG
BH CV STRESS RECOVERY HR: 99 BPM
BH CV STRESS SPEED STAGE 1: 1.7
BH CV STRESS SPEED STAGE 2: 2.5
BH CV STRESS SPEED STAGE 3: 3.4
BH CV STRESS SPEED STAGE 4: 4.2
BH CV STRESS STAGE 1: 1
BH CV STRESS STAGE 2: 2
BH CV STRESS STAGE 3: 3
BH CV STRESS STAGE 4: 4
BH CV XLRA - RV BASE: 3.2 CM
BH CV XLRA - TDI S': 11 CM/SEC
E/E' RATIO: 6
LEFT ATRIUM VOLUME INDEX: 26 ML/M2
LV EF NUC BP: 54 %
MAXIMAL PREDICTED HEART RATE: 176 BPM
MAXIMAL PREDICTED HEART RATE: 176 BPM
PERCENT MAX PREDICTED HR: 94.89 %
SINUS: 3.3 CM
STJ: 3.3 CM
STRESS BASELINE BP: NORMAL MMHG
STRESS BASELINE HR: 75 BPM
STRESS PERCENT HR: 112 %
STRESS POST ESTIMATED WORKLOAD: 10 METS
STRESS POST EXERCISE DUR MIN: 9 MIN
STRESS POST EXERCISE DUR SEC: 30 SEC
STRESS POST PEAK BP: NORMAL MMHG
STRESS POST PEAK HR: 167 BPM
STRESS TARGET HR: 150 BPM
STRESS TARGET HR: 150 BPM

## 2017-11-29 PROCEDURE — 93016 CV STRESS TEST SUPVJ ONLY: CPT | Performed by: INTERNAL MEDICINE

## 2017-11-29 PROCEDURE — 78452 HT MUSCLE IMAGE SPECT MULT: CPT

## 2017-11-29 PROCEDURE — 93306 TTE W/DOPPLER COMPLETE: CPT

## 2017-11-29 PROCEDURE — 93017 CV STRESS TEST TRACING ONLY: CPT

## 2017-11-29 PROCEDURE — 78452 HT MUSCLE IMAGE SPECT MULT: CPT | Performed by: INTERNAL MEDICINE

## 2017-11-29 PROCEDURE — 0 TECHNETIUM TETROFOSMIN KIT: Performed by: INTERNAL MEDICINE

## 2017-11-29 PROCEDURE — 93306 TTE W/DOPPLER COMPLETE: CPT | Performed by: INTERNAL MEDICINE

## 2017-11-29 PROCEDURE — A9502 TC99M TETROFOSMIN: HCPCS | Performed by: INTERNAL MEDICINE

## 2017-11-29 PROCEDURE — 93018 CV STRESS TEST I&R ONLY: CPT | Performed by: INTERNAL MEDICINE

## 2017-11-29 RX ADMIN — TETROFOSMIN 1 DOSE: 1.38 INJECTION, POWDER, LYOPHILIZED, FOR SOLUTION INTRAVENOUS at 10:50

## 2017-11-29 RX ADMIN — TETROFOSMIN 1 DOSE: 1.38 INJECTION, POWDER, LYOPHILIZED, FOR SOLUTION INTRAVENOUS at 11:57

## 2018-01-01 ENCOUNTER — RESULTS ENCOUNTER (OUTPATIENT)
Dept: FAMILY MEDICINE CLINIC | Facility: CLINIC | Age: 43
End: 2018-01-01

## 2018-01-01 DIAGNOSIS — Z00.00 ROUTINE GENERAL MEDICAL EXAMINATION AT HEALTH CARE FACILITY: ICD-10-CM

## 2018-01-15 ENCOUNTER — OFFICE VISIT (OUTPATIENT)
Dept: FAMILY MEDICINE CLINIC | Facility: CLINIC | Age: 43
End: 2018-01-15

## 2018-01-15 VITALS
WEIGHT: 192 LBS | BODY MASS INDEX: 29.1 KG/M2 | OXYGEN SATURATION: 99 % | HEART RATE: 91 BPM | DIASTOLIC BLOOD PRESSURE: 86 MMHG | HEIGHT: 68 IN | SYSTOLIC BLOOD PRESSURE: 116 MMHG

## 2018-01-15 DIAGNOSIS — K58.0 IRRITABLE BOWEL SYNDROME WITH DIARRHEA: Primary | ICD-10-CM

## 2018-01-15 DIAGNOSIS — Z23 FLU VACCINE NEED: ICD-10-CM

## 2018-01-15 DIAGNOSIS — R07.9 CHEST PAIN, UNSPECIFIED TYPE: ICD-10-CM

## 2018-01-15 PROCEDURE — 99214 OFFICE O/P EST MOD 30 MIN: CPT | Performed by: INTERNAL MEDICINE

## 2018-01-15 RX ORDER — AMITRIPTYLINE HYDROCHLORIDE 10 MG/1
10 TABLET, FILM COATED ORAL NIGHTLY
Qty: 180 TABLET | Refills: 0 | Status: SHIPPED | OUTPATIENT
Start: 2018-01-15 | End: 2018-01-16 | Stop reason: SDUPTHER

## 2018-01-15 NOTE — PROGRESS NOTES
Chief Complaint   Patient presents with   • Diarrhea     Worsening bowel issues   • Chest Pain     SOB still there     He has chronic bowel problems with a sense of fecal urgency constantly every day.  Sometimes his bowels move frequently (6 times today), and some days the bowels move once or twice, normal consistency.  He has not had any further BRBPR (since the c-scope in 9/2016).    He has a longstanding history of these problems.  He notices an improvement with certain foods, but always has some issues.  He alternates b/t PPIs about every 30 days, with breakthrough symptoms after missing one dose.    Evaluation prior to today's office visit, since becoming my patient, has included the following: Laboratory studies that have shown normal B12 levels, normal iron levels, no H pylori antibodies, no celiac antibodies (1/2017); normal TSH, CBC and CMP in October, 2017.  KUB in 8/2016 was unremarkable.  Images through the upper abdomen done at the time of his CT scans have been unremarkable.  Colonoscopy in 9/2016 revealed a 30 mm tubulovillous adenoma, internal hemorrhoids, but no other abnormalities.  He has not seen his gastroenterologist (Dr. Awan) since the colonoscopy.     ROS: No dysphagia or odynophagia. No urinary symptoms.  No weight loss.  No fever/chills/night sweats.  No arthralgias or myalgias, although he does have chronic chest wall pain.  This manifests as tenderness along the left side of the sternum, reproduced with palpation and by certain movements.    Medical history includes hyperlipidemia; tubulovillous adenoma in 2016; factor V Leiden deficiency.  Evaluation of his chest pain has included multiple CT scans over the last few years to rule out pulmonary embolus; he has never been diagnosed with a PE.  Chest CT scans have been normal otherwise.  He has also undergone cardiovascular evaluation; those results were also normal.        He  reports that he has quit smoking. His smoking use included  Cigars and Cigarettes. He has never used smokeless tobacco. He reports that he drinks about 7.2 oz of alcohol per week  He reports that he does not use illicit drugs.       Allergies   Allergen Reactions   • No Known Drug Allergy        Current Outpatient Prescriptions:   •  esomeprazole (nexIUM) 40 MG capsule, Take 40 mg by mouth Every Morning Before Breakfast., Disp: , Rfl:   •  amitriptyline (ELAVIL) 10 MG tablet, Take 1 tablet by mouth Every Night., Disp: 180 tablet, Rfl: 0  •  atorvastatin (LIPITOR) 10 MG tablet, Take 1 tablet by mouth Daily., Disp: 90 tablet, Rfl: 0  •  pantoprazole (PROTONIX) 40 MG EC tablet, Take 1 tablet by mouth Daily., Disp: 30 tablet, Rfl: 2    EXAM:    Well-developed, well-nourished, in no acute distress.  Sclerae are anicteric and conjunctiva are pink.  No thyromegaly or mass appreciated.  Regular rate and rhythm.  No murmur or rub appreciated.    Mildly tender along the left side of the sternum, at the more superior aspect.  No crepitus appreciated.  Clear to auscultation bilaterally with normal chest excursion, normal respiratory effort, and no adventitious breath sounds.  Abdomen is mildly protuberant, but soft, nondistended, nontender, with normoactive bowel sounds and no evidence of hepatosplenomegaly or mass.  No cervical, supraclavicular, or inguinal lymphadenopathy appreciated.  No lower extremity edema and pedal pulses are full bilaterally.  Rectal exam was deferred.        Raimundo was seen today for constipation and chest pain.    Diagnoses and all orders for this visit:    Irritable bowel syndrome with diarrhea    -     amitriptyline (ELAVIL) 10 MG tablet; Take 1 tablet by mouth Every Night.    Chest wall pain  Elavil may help with the chest wall pain, also.  Eval negative to date.    Flu vaccine need  -     Flu Vaccine Quad PF 3YR+      Evaluation noted above was reviewed at length with the patient today.  No additional evaluation will be pursued today.  Instead, we will  treat for irritable bowel syndrome with the amitriptyline. He will start with 10 mg nightly, increase the dose by 1 tablet every 5-7 days unless side effects prevent it. He will hold it the dose that gives him some relief from the abdominal cramping/pressure and the bowel frequency.  We discussed alternative medications, but settled on the Elavil since it may also help with the chronic chest wall discomfort.    25 minutes of the 30 minute office visit were spent in face-to-face counseling with the patient, reviewing the previous evaluation, reassuring him, answering questions, and discussing treatment options.

## 2018-01-16 DIAGNOSIS — E78.00 ELEVATED CHOLESTEROL: ICD-10-CM

## 2018-01-16 DIAGNOSIS — K21.9 GASTROESOPHAGEAL REFLUX DISEASE, ESOPHAGITIS PRESENCE NOT SPECIFIED: ICD-10-CM

## 2018-01-16 RX ORDER — PANTOPRAZOLE SODIUM 40 MG/1
40 TABLET, DELAYED RELEASE ORAL DAILY
Qty: 30 TABLET | Refills: 0 | Status: SHIPPED | OUTPATIENT
Start: 2018-01-16 | End: 2018-05-28 | Stop reason: SDUPTHER

## 2018-01-16 RX ORDER — PANTOPRAZOLE SODIUM 40 MG/1
40 TABLET, DELAYED RELEASE ORAL DAILY
Qty: 90 TABLET | Refills: 0 | Status: SHIPPED | OUTPATIENT
Start: 2018-01-16 | End: 2018-01-16 | Stop reason: SDUPTHER

## 2018-01-16 RX ORDER — AMITRIPTYLINE HYDROCHLORIDE 10 MG/1
TABLET, FILM COATED ORAL
Qty: 60 TABLET | Refills: 0 | Status: SHIPPED | OUTPATIENT
Start: 2018-01-16 | End: 2018-02-07 | Stop reason: SINTOL

## 2018-01-16 RX ORDER — ATORVASTATIN CALCIUM 10 MG/1
10 TABLET, FILM COATED ORAL DAILY
Qty: 30 TABLET | Refills: 0 | Status: SHIPPED | OUTPATIENT
Start: 2018-01-16 | End: 2018-05-25 | Stop reason: SDUPTHER

## 2018-01-16 RX ORDER — ATORVASTATIN CALCIUM 10 MG/1
10 TABLET, FILM COATED ORAL DAILY
Qty: 90 TABLET | Refills: 0 | Status: SHIPPED | OUTPATIENT
Start: 2018-01-16 | End: 2018-01-16 | Stop reason: SDUPTHER

## 2018-01-22 DIAGNOSIS — K58.0 IRRITABLE BOWEL SYNDROME WITH DIARRHEA: Primary | ICD-10-CM

## 2018-01-22 RX ORDER — HYOSCYAMINE SULFATE 0.12 MG/1
1 TABLET SUBLINGUAL EVERY 8 HOURS PRN
Qty: 60 EACH | Refills: 0 | Status: SHIPPED | OUTPATIENT
Start: 2018-01-22 | End: 2018-02-07 | Stop reason: ALTCHOICE

## 2018-01-31 RX ORDER — AMITRIPTYLINE HYDROCHLORIDE 10 MG/1
TABLET, FILM COATED ORAL
Qty: 180 TABLET | Refills: 0 | OUTPATIENT
Start: 2018-01-31

## 2018-02-07 ENCOUNTER — OFFICE VISIT (OUTPATIENT)
Dept: FAMILY MEDICINE CLINIC | Facility: CLINIC | Age: 43
End: 2018-02-07

## 2018-02-07 VITALS
WEIGHT: 193.9 LBS | HEART RATE: 69 BPM | BODY MASS INDEX: 29.39 KG/M2 | SYSTOLIC BLOOD PRESSURE: 110 MMHG | DIASTOLIC BLOOD PRESSURE: 82 MMHG | OXYGEN SATURATION: 98 % | HEIGHT: 68 IN

## 2018-02-07 DIAGNOSIS — Z81.8: ICD-10-CM

## 2018-02-07 DIAGNOSIS — K58.0 IRRITABLE BOWEL SYNDROME WITH DIARRHEA: Primary | ICD-10-CM

## 2018-02-07 PROCEDURE — 99213 OFFICE O/P EST LOW 20 MIN: CPT | Performed by: INTERNAL MEDICINE

## 2018-02-07 RX ORDER — DICYCLOMINE HYDROCHLORIDE 10 MG/1
10 CAPSULE ORAL
Qty: 120 CAPSULE | Refills: 0 | Status: SHIPPED | OUTPATIENT
Start: 2018-02-07 | End: 2018-03-15

## 2018-02-07 NOTE — PROGRESS NOTES
"Irritable Bowel Syndrome (f/u)    He has chronic bowel problems with a sense of fecal urgency constantly every day.  Sometimes his bowels move frequently (5 times today), and some days the bowels move once or twice, normal consistency.  He has not had any further BRBPR (since the c-scope in 9/2016).     He has a longstanding history of these problems.  He notices an improvement with certain foods, but always has some issues.  He alternates b/t PPIs about every 30 days, with breakthrough symptoms after missing one dose.     Evaluation prior to his 1/15/2018 office visit, since becoming my patient, has included the following: Laboratory studies that have shown normal B12 levels, normal iron levels, no H pylori antibodies, no celiac antibodies (1/2017); normal TSH, CBC and CMP in October, 2017.  KUB in 8/2016 was unremarkable.  Images through the upper abdomen done at the time of his CT scans have been unremarkable.  Colonoscopy in 9/2016 revealed a 30 mm tubulovillous adenoma, internal hemorrhoids, but no other abnormalities.  He has not seen his gastroenterologist (Dr. Awan) since the colonoscopy.    Interval history:  Elavil 10 mg started at the 1/15/18 ov caused him to feel hungover the following morning.  We changed to Levsin with immediate relief initially.  Over the past few days, he has noticed worsening mood lability and irritability.  He calms down quickly after losing his temper.  He has some breakthrough GI symptoms but feels much better (\"60% better\").     ROS: No dysphagia or odynophagia. No urinary symptoms.  No weight loss. No fever/chills/night sweats.  No arthralgias or myalgias, although he does have chronic chest wall pain.  This manifests as tenderness along the left side of the sternum, reproduced with palpation and by certain movements.  Mood swings as noted above.  No SI/HI.  Intermittent headaches.     Medical history includes hyperlipidemia; tubulovillous adenoma in 2016; factor V Leiden " "deficiency.  Evaluation of his chest pain has included multiple CT scans over the last few years to rule out pulmonary embolus; he has never been diagnosed with a PE.  Chest CT scans have been normal otherwise.  He has also undergone cardiovascular evaluation; those results were also normal.    Family history is significant for ADHD in two children; there is no family history of colon cancer or IBD.        Current Outpatient Prescriptions:   •  atorvastatin (LIPITOR) 10 MG tablet, Take 1 tablet by mouth Daily., Disp: 30 tablet, Rfl: 0  •  Hyoscyamine Sulfate SL (LEVSIN/SL) 0.125 MG sublingual tablet, Place 1 tablet under the tongue Every 8 (Eight) Hours As Needed (abdominal pain or nausea)., Disp: 60 each, Rfl: 0  •  pantoprazole (PROTONIX) 40 MG EC tablet, Take 1 tablet by mouth Daily., alternating with:   •  esomeprazole (nexIUM) 40 MG capsule, Take 40 mg by mouth Every Morning Before Breakfast., Disp: , Rfl:     Allergies   Allergen Reactions   • No Known Drug Allergy        /82 (BP Location: Left arm, Patient Position: Sitting, Cuff Size: Adult)  Pulse 69  Ht 172.7 cm (68\")  Wt 88 kg (193 lb 14.4 oz)  SpO2 98%  BMI 29.48 kg/m2      EXAM:  In good spirits.      Raimundo was seen today for irritable bowel syndrome.    Diagnoses and all orders for this visit:    Irritable bowel syndrome with diarrhea    -     dicyclomine (BENTYL) 10 MG capsule; Take 1 capsule by mouth 4 (Four) Times a Day Before Meals & at Bedtime.    We will stop the Levsin for now.  We may return to it depending on how he responds to the Bentyl prescribed for him today.  If he does not feel any better with the 10 mg dose, he should double it and take 20 mg at a time; we may even work up to 40 mg at a time.  Should it cause the emotional side effects he feels Levsin is causing, he will stop it and give us a call.    We will set in motion evaluating him for ADHD.  It's possible that treating an underlying condition like this may also help " with his GI symptoms.    20 minutes of the 20 minute office visit were spent counseling the patient regarding the potential interaction between psychological issues and GI problems.  We discussed various treatment options before settling on the treatment plan above.  Potential side effects of the Bentyl were also discussed with the patient.  We will avoid SNRIs for his possible ADHD because of GI side effects.

## 2018-03-15 ENCOUNTER — OFFICE VISIT (OUTPATIENT)
Dept: FAMILY MEDICINE CLINIC | Facility: CLINIC | Age: 43
End: 2018-03-15

## 2018-03-15 VITALS
HEART RATE: 89 BPM | BODY MASS INDEX: 28.19 KG/M2 | OXYGEN SATURATION: 94 % | SYSTOLIC BLOOD PRESSURE: 100 MMHG | DIASTOLIC BLOOD PRESSURE: 68 MMHG | HEIGHT: 68 IN | WEIGHT: 186 LBS

## 2018-03-15 DIAGNOSIS — K58.0 IRRITABLE BOWEL SYNDROME WITH DIARRHEA: Primary | ICD-10-CM

## 2018-03-15 DIAGNOSIS — N52.2 DRUG-INDUCED ERECTILE DYSFUNCTION: ICD-10-CM

## 2018-03-15 DIAGNOSIS — K21.9 GASTROESOPHAGEAL REFLUX DISEASE, ESOPHAGITIS PRESENCE NOT SPECIFIED: ICD-10-CM

## 2018-03-15 PROCEDURE — 99213 OFFICE O/P EST LOW 20 MIN: CPT | Performed by: INTERNAL MEDICINE

## 2018-03-15 RX ORDER — DICYCLOMINE HCL 20 MG
20 TABLET ORAL 2 TIMES DAILY
Qty: 60 TABLET | Refills: 3 | Status: SHIPPED | OUTPATIENT
Start: 2018-03-15 | End: 2018-05-25 | Stop reason: SDUPTHER

## 2018-03-15 NOTE — PROGRESS NOTES
"Irritable Bowel Syndrome (8 week f/u)    He has chronic bowel problems with a sense of fecal urgency constantly every day.  Sometimes his bowels move frequently (5 times today), and some days the bowels move once or twice, normal consistency.  He has not had any further BRBPR (since the c-scope in 9/2016).    Taking Bentyl 20 mg twice a day (11 AM and 11 PM) with much better control of GI symptoms.  He still feels a sense of fecal urgency, but it is \"80% better\".  He has noticed a decrease in his libido and his ability to obtain/maintain an erection.    Evaluation prior to his 1/15/2018 office visit, since becoming my patient, has included the following: Laboratory studies that have shown normal B12 levels, normal iron levels, no H pylori antibodies, no celiac antibodies (1/2017); normal TSH, CBC and CMP in October, 2017.  KUB in 8/2016 was unremarkable.  Images through the upper abdomen done at the time of his CT scans have been unremarkable.  Colonoscopy in 9/2016 revealed a 30 mm tubulovillous adenoma, internal hemorrhoids, but no other abnormalities.  He has not seen his gastroenterologist (Dr. Awan) since the colonoscopy.    Ride Elavil 1/15/18 but that caused him to feel hung over.  We tried Levsin with initial benefit that waned and caused worsening mood lability/irritability.  GI symptoms were improved (about 60% better) with the Levsin.  Bentyl was started 2/7/18.    ROS: no headaches; no mood changes; mild dry mouth; no urinary issues; no BRBPR.  ED as noted above.    Allergies   Allergen Reactions   • No Known Drug Allergy      He  reports that he has quit smoking. His smoking use included Cigars and Cigarettes. He has never used smokeless tobacco. He reports that he drinks about 7.2 oz of alcohol per week . He reports that he does not use drugs.      Current Outpatient Prescriptions:   •  atorvastatin (LIPITOR) 10 MG tablet, Take 1 tablet by mouth Daily., Disp: 30 tablet, Rfl: 0  •  dicyclomine " "(BENTYL) 10 MG capsule, Take 1 capsule by mouth 4 (Four) Times a Day Before Meals & at Bedtime., Disp: 120 capsule, Rfl: 0  •  esomeprazole (nexIUM) 40 MG capsule, Take 40 mg by mouth Every Morning Before Breakfast., alternating with:    •  pantoprazole (PROTONIX) 40 MG EC tablet, Take 1 tablet by mouth Daily., Disp: 30 tablet, Rfl: 0      /68 (BP Location: Left arm, Patient Position: Sitting, Cuff Size: Adult)   Pulse 89   Ht 172.7 cm (68\")   Wt 84.4 kg (186 lb)   SpO2 94%   BMI 28.28 kg/m²     EXAM:  Anicteric.  Abd soft and NT; NABS.  Affect appropriate.      Raimundo was seen today for irritable bowel syndrome.    Diagnoses and all orders for this visit:    Irritable bowel syndrome with diarrhea  Comments:  Dr. Awan: [phone]  Orders:  -     dicyclomine (BENTYL) 20 MG tablet; Take 1 tablet by mouth 2 (Two) Times a Day.  -     Ambulatory Referral to Gastroenterology    Gastroesophageal reflux disease, esophagitis presence not specified    Drug-induced erectile dysfunction    He will continue with the PPI unchanged.  He will also continue the Bentyl 20 mg twice a day for now.  He will discuss with his gastroenterologist any alternatives that may also help with his GI tract but also eliminate the ED symptoms that have developed presumably as a result of the Bentyl.  If no other options are available, we may try adding a medicine like Viagra to the regimen to see if that helps.  He will contact our office if we need to go down that road.      "

## 2018-04-12 DIAGNOSIS — G44.219 EPISODIC TENSION-TYPE HEADACHE, NOT INTRACTABLE: ICD-10-CM

## 2018-04-12 DIAGNOSIS — D68.2: ICD-10-CM

## 2018-04-12 DIAGNOSIS — E78.00 ELEVATED CHOLESTEROL: Primary | ICD-10-CM

## 2018-05-25 DIAGNOSIS — K21.9 GASTROESOPHAGEAL REFLUX DISEASE, ESOPHAGITIS PRESENCE NOT SPECIFIED: ICD-10-CM

## 2018-05-25 DIAGNOSIS — E78.00 ELEVATED CHOLESTEROL: ICD-10-CM

## 2018-05-25 DIAGNOSIS — K58.0 IRRITABLE BOWEL SYNDROME WITH DIARRHEA: ICD-10-CM

## 2018-05-28 RX ORDER — ATORVASTATIN CALCIUM 10 MG/1
TABLET, FILM COATED ORAL
Qty: 90 TABLET | Refills: 0 | Status: SHIPPED | OUTPATIENT
Start: 2018-05-28 | End: 2018-08-03 | Stop reason: SDUPTHER

## 2018-05-28 RX ORDER — DICYCLOMINE HCL 20 MG
20 TABLET ORAL 2 TIMES DAILY
Qty: 180 TABLET | Refills: 0 | Status: SHIPPED | OUTPATIENT
Start: 2018-05-28 | End: 2018-05-30 | Stop reason: SDUPTHER

## 2018-05-28 RX ORDER — PANTOPRAZOLE SODIUM 40 MG/1
TABLET, DELAYED RELEASE ORAL
Qty: 90 TABLET | Refills: 0 | Status: SHIPPED | OUTPATIENT
Start: 2018-05-28 | End: 2018-08-03 | Stop reason: SDUPTHER

## 2018-05-30 DIAGNOSIS — K58.0 IRRITABLE BOWEL SYNDROME WITH DIARRHEA: ICD-10-CM

## 2018-05-30 RX ORDER — DICYCLOMINE HCL 20 MG
20 TABLET ORAL 2 TIMES DAILY
Qty: 180 TABLET | Refills: 0 | Status: SHIPPED | OUTPATIENT
Start: 2018-05-30 | End: 2018-09-04 | Stop reason: SDUPTHER

## 2018-07-10 ENCOUNTER — OFFICE VISIT (OUTPATIENT)
Dept: FAMILY MEDICINE CLINIC | Facility: CLINIC | Age: 43
End: 2018-07-10

## 2018-07-10 VITALS
SYSTOLIC BLOOD PRESSURE: 122 MMHG | DIASTOLIC BLOOD PRESSURE: 78 MMHG | HEIGHT: 68 IN | BODY MASS INDEX: 28.16 KG/M2 | OXYGEN SATURATION: 97 % | WEIGHT: 185.8 LBS | TEMPERATURE: 98.3 F | HEART RATE: 85 BPM

## 2018-07-10 DIAGNOSIS — J30.1 ACUTE SEASONAL ALLERGIC RHINITIS DUE TO POLLEN: Primary | ICD-10-CM

## 2018-07-10 PROCEDURE — 99213 OFFICE O/P EST LOW 20 MIN: CPT | Performed by: NURSE PRACTITIONER

## 2018-07-10 NOTE — PROGRESS NOTES
Subjective   Raimundo Allison is a 43 y.o. male who presents today for:    Chest Soreness (Since going to [place].  Mother has bronchitis. Mowing grass yesterday has mad this worse) and Cough    Mr. Allison presents today with complaints of a sore chest since yesterday. He also woke up with an itchy throat yesterday. He is concerned that he might have bronchitis because he was recently visiting his mother in [place] and she has bronchitis. He returned form [place] 2 days ago. He also states he was mowing the grass yesterday and he has to take Zyrtec while he mows the grass. He denies fever, chills, body aches, cough, congestion.      I have reviewed the patient's medical history in detail and updated the computerized patient record.      Mr. Allison  reports that he has quit smoking. His smoking use included Cigars and Cigarettes. He has never used smokeless tobacco. He reports that he drinks about 7.2 oz of alcohol per week . He reports that he does not use drugs.     Allergies   Allergen Reactions   • No Known Drug Allergy        Current Outpatient Prescriptions:   •  atorvastatin (LIPITOR) 10 MG tablet, TAKE 1 TABLET DAILY, Disp: 90 tablet, Rfl: 0  •  dicyclomine (BENTYL) 20 MG tablet, Take 1 tablet by mouth 2 (Two) Times a Day., Disp: 180 tablet, Rfl: 0  •  esomeprazole (nexIUM) 40 MG capsule, Take 40 mg by mouth Every Morning Before Breakfast., Disp: , Rfl:   •  pantoprazole (PROTONIX) 40 MG EC tablet, TAKE 1 TABLET DAILY, Disp: 90 tablet, Rfl: 0      Review of Systems   Constitutional: Negative for chills and fever.   HENT: Positive for sneezing. Negative for congestion, postnasal drip, rhinorrhea and sore throat.    Respiratory: Positive for cough (intermittent). Negative for shortness of breath and wheezing.         Chest soreness   Musculoskeletal: Negative for myalgias.         Objective   Vitals:    07/10/18 1451   BP: 122/78   BP Location: Left arm   Patient Position: Sitting   Cuff Size: Adult   Pulse: 85  "  Temp: 98.3 °F (36.8 °C)   TempSrc: Oral   SpO2: 97%   Weight: 84.3 kg (185 lb 12.8 oz)   Height: 172.7 cm (67.99\")     Physical Exam   Constitutional: He is oriented to person, place, and time. He appears well-developed and well-nourished.   HENT:   Right Ear: External ear normal.   Left Ear: External ear normal.   Nose: Nose normal.   Mouth/Throat: Oropharynx is clear and moist.   Cardiovascular: Normal rate, regular rhythm and normal heart sounds.    Pulmonary/Chest: Effort normal and breath sounds normal. He has no wheezes. He has no rales.   Neurological: He is alert and oriented to person, place, and time.   Skin: Skin is warm and dry.   Psychiatric:   No acute distress   Vitals reviewed.            Raimundo was seen today for chest soreness and cough.    Diagnoses and all orders for this visit:    Acute seasonal allergic rhinitis due to pollen    You have been diagnosed with allergic rhinitis. Symptomatic treatment is best.  You may take Mucinex D for relieving congestion and cough.  If you have high blood pressure, do not take Mucinex D, instead opting for plain Mucinex and Coricidin HBP. Oral antihistamine, such as Allegra, Zyrtec or Claritin may help reduce ear pressure and relieve some nasal symptoms.  A saline nasal spray may be used to keep nose clear from discharge.  Be sure that you are increasing your intake of clear to decaffeinated fluids and get plenty of rest.  If your symptoms worsen or persist follow up as needed.  "

## 2018-07-12 RX ORDER — AZITHROMYCIN 250 MG/1
TABLET, FILM COATED ORAL
Qty: 6 TABLET | Refills: 0 | Status: SHIPPED | OUTPATIENT
Start: 2018-07-12 | End: 2018-10-08

## 2018-07-16 ENCOUNTER — OFFICE VISIT (OUTPATIENT)
Dept: GASTROENTEROLOGY | Facility: CLINIC | Age: 43
End: 2018-07-16

## 2018-07-16 VITALS
SYSTOLIC BLOOD PRESSURE: 122 MMHG | BODY MASS INDEX: 25.68 KG/M2 | HEIGHT: 71 IN | DIASTOLIC BLOOD PRESSURE: 70 MMHG | WEIGHT: 183.4 LBS | TEMPERATURE: 97.8 F

## 2018-07-16 DIAGNOSIS — D12.2 ADENOMATOUS POLYP OF ASCENDING COLON: ICD-10-CM

## 2018-07-16 DIAGNOSIS — R14.0 ABDOMINAL BLOATING: Primary | ICD-10-CM

## 2018-07-16 DIAGNOSIS — K58.0 IRRITABLE BOWEL SYNDROME WITH DIARRHEA: ICD-10-CM

## 2018-07-16 PROCEDURE — 99213 OFFICE O/P EST LOW 20 MIN: CPT | Performed by: INTERNAL MEDICINE

## 2018-07-16 NOTE — PROGRESS NOTES
Chief Complaint   Patient presents with   • Irritable Bowel Syndrome   • Heartburn       Raimundo Allison is a  43 y.o. male here for a follow up visit for IBS-D and TVA    Colonoscopy 2 years ago with one 3 cm tubulovillous adenoma removed  Colonoscopy is due September 2019    He has no alarm symptoms that would require repeat colonoscopy at this time      Irritable Bowel Syndrome   This is a chronic problem. The current episode started more than 1 year ago. The problem occurs 2 to 4 times per day. The problem has been waxing and waning. Associated symptoms include abdominal pain, anorexia, a change in bowel habit, fatigue, headaches and nausea. Pertinent negatives include no joint swelling, sore throat, swollen glands or vomiting. Nothing aggravates the symptoms. Treatments tried: He is tried Elavil, Bentyl, Robinul and Levsin and unfortunately had intolerable side effects with each. The treatment provided mild relief.   Heartburn   He complains of abdominal pain and nausea. He reports no sore throat. Associated symptoms include fatigue.       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)    • Bronchitis    • Chest pain    • Costochondritis    • Factor 5 Leiden mutation, heterozygous (CMS/HCC)    • Family history of heart disease 10/13/2017    Father   • GERD (gastroesophageal reflux disease)    • GERD (gastroesophageal reflux disease)    • High cholesterol    • Hyperlipidemia    • Irritable bowel syndrome        Past Surgical History:   Procedure Laterality Date   • COLONOSCOPY N/A 9/23/2016    TVA w/low grade dysplasia, focal ulceration   • SHOULDER ACROMIOPLASTY Bilateral    • SHOULDER ARTHROSCOPY  2015    ALISON Holland MD       Scheduled Meds:    Continuous Infusions:  No current facility-administered medications for this visit.     PRN Meds:.    No Known Allergies    Social History     Social History   • Marital status:      Spouse name: N/A   • Number of  children: N/A   • Years of education: N/A     Occupational History   • Not on file.     Social History Main Topics   • Smoking status: Former Smoker     Types: Cigars, Cigarettes   • Smokeless tobacco: Never Used   • Alcohol use 9.6 oz/week     12 Standard drinks or equivalent, 4 Cans of beer per week      Comment: daily   • Drug use: No   • Sexual activity: Not on file     Other Topics Concern   • Not on file     Social History Narrative   • No narrative on file       Family History   Problem Relation Age of Onset   • Colon polyps Mother    • Factor V Leiden deficiency Father    • Heart attack Father    • Factor V Leiden deficiency Sister    • Factor V Leiden deficiency Brother    • Colon cancer Maternal Uncle         late 50s       Review of Systems   Constitutional: Positive for fatigue.   HENT: Negative for sore throat.    Gastrointestinal: Positive for abdominal pain, anorexia, change in bowel habit and nausea. Negative for vomiting.   Musculoskeletal: Negative for joint swelling.   Neurological: Positive for headaches.   All other systems reviewed and are negative.      Vitals:    07/16/18 1248   BP: 122/70   Temp: 97.8 °F (36.6 °C)       Physical Exam   Constitutional: He is oriented to person, place, and time. He appears well-developed and well-nourished.   HENT:   Head: Normocephalic and atraumatic.   Eyes: Conjunctivae and EOM are normal.   Neck: Normal range of motion. No tracheal deviation present.   Cardiovascular: Normal rate and regular rhythm.    Pulmonary/Chest: Effort normal and breath sounds normal. No respiratory distress.   Abdominal: Soft. Bowel sounds are normal. He exhibits no distension and no mass. There is no tenderness. There is no rebound and no guarding.   Musculoskeletal: Normal range of motion.   Neurological: He is alert and oriented to person, place, and time.   Skin: Skin is warm and dry.   Psychiatric: He has a normal mood and affect. Judgment normal.   Nursing note and vitals  reviewed.      No images are attached to the encounter.    Problem list    Tubulovillous adenoma removed 2 yrs ago  IBS-D, Levsin and tricyclic antidepressant caused dry mouth and dry eyes and he had to stop these      Assessment/Plan    Plan for colonoscopy September 2019  We will try Xifaxan 550 mg by mouth 3 times a day for 14 days as a drug that is now FDA approved for IBS-D  I've given him samples of IBGARD 2 tablets every 12 hours as needed, he can get this over-the-counter if he needs more samples  Options in the future include viberzi for IBS-D  We'll see him back in the office in 8 weeks to check his symptoms after Xifaxan treatment

## 2018-07-17 ENCOUNTER — TELEPHONE (OUTPATIENT)
Dept: GASTROENTEROLOGY | Facility: CLINIC | Age: 43
End: 2018-07-17

## 2018-07-17 NOTE — TELEPHONE ENCOUNTER
----- Message from Suleiman Aguilar sent at 7/17/2018  4:02 PM EDT -----  Regarding: XIFAXAN   Contact: [phone]  PT WIFE CALLED WITH QUESTIONS ABOUT XIFAXAN

## 2018-07-17 NOTE — TELEPHONE ENCOUNTER
Returned phone call, he question if we received the PA paperwork from [facility]. Advised will send an update to Suzi and if she did not get the paperwork she will contact the .

## 2018-07-19 NOTE — TELEPHONE ENCOUNTER
Spoke to [facility] pharmacy. Medication does not need a PA. They have already filled it but the copay is $300. The patient has not picked it up.

## 2018-07-29 ENCOUNTER — RESULTS ENCOUNTER (OUTPATIENT)
Dept: FAMILY MEDICINE CLINIC | Facility: CLINIC | Age: 43
End: 2018-07-29

## 2018-07-29 DIAGNOSIS — D68.2: ICD-10-CM

## 2018-07-29 DIAGNOSIS — E78.00 ELEVATED CHOLESTEROL: ICD-10-CM

## 2018-07-29 DIAGNOSIS — G44.219 EPISODIC TENSION-TYPE HEADACHE, NOT INTRACTABLE: ICD-10-CM

## 2018-08-03 DIAGNOSIS — K21.9 GASTROESOPHAGEAL REFLUX DISEASE, ESOPHAGITIS PRESENCE NOT SPECIFIED: ICD-10-CM

## 2018-08-03 DIAGNOSIS — E78.00 ELEVATED CHOLESTEROL: ICD-10-CM

## 2018-08-06 RX ORDER — ATORVASTATIN CALCIUM 10 MG/1
TABLET, FILM COATED ORAL
Qty: 90 TABLET | Refills: 0 | Status: SHIPPED | OUTPATIENT
Start: 2018-08-06 | End: 2018-11-04 | Stop reason: SDUPTHER

## 2018-08-06 RX ORDER — PANTOPRAZOLE SODIUM 40 MG/1
TABLET, DELAYED RELEASE ORAL
Qty: 90 TABLET | Refills: 0 | Status: SHIPPED | OUTPATIENT
Start: 2018-08-06 | End: 2018-11-04 | Stop reason: SDUPTHER

## 2018-09-04 DIAGNOSIS — K58.0 IRRITABLE BOWEL SYNDROME WITH DIARRHEA: ICD-10-CM

## 2018-09-05 RX ORDER — DICYCLOMINE HCL 20 MG
TABLET ORAL
Qty: 180 TABLET | Refills: 0 | Status: SHIPPED | OUTPATIENT
Start: 2018-09-05 | End: 2018-11-01

## 2018-09-17 ENCOUNTER — TELEPHONE (OUTPATIENT)
Dept: GASTROENTEROLOGY | Facility: CLINIC | Age: 43
End: 2018-09-17

## 2018-09-17 NOTE — TELEPHONE ENCOUNTER
----- Message from Suleiman Aguilar sent at 9/17/2018 10:55 AM EDT -----  Regarding: MED   Contact: [phone]  PT CALLED STATED NEVER GOT THE MEDICATION FOR IBS..

## 2018-09-17 NOTE — TELEPHONE ENCOUNTER
Returned phone call, spoke with patient's wife Barbara. She states the oop cost for Xifaxan was $300 and too expensive. Advised have a 14 day sample of Xifaxan, will place at  for him to  at his convenience. She verb understanding and will relay message. F/u visit with NP scheduled on 10/19.

## 2018-10-08 ENCOUNTER — OFFICE VISIT (OUTPATIENT)
Dept: FAMILY MEDICINE CLINIC | Facility: CLINIC | Age: 43
End: 2018-10-08

## 2018-10-08 VITALS
HEIGHT: 71 IN | SYSTOLIC BLOOD PRESSURE: 104 MMHG | WEIGHT: 186.4 LBS | OXYGEN SATURATION: 98 % | HEART RATE: 106 BPM | BODY MASS INDEX: 26.1 KG/M2 | DIASTOLIC BLOOD PRESSURE: 72 MMHG

## 2018-10-08 DIAGNOSIS — R39.89 ABNORMAL URINE COLOR: Primary | ICD-10-CM

## 2018-10-08 LAB
BILIRUB BLD-MCNC: NEGATIVE MG/DL
CLARITY, POC: CLEAR
COLOR UR: YELLOW
GLUCOSE UR STRIP-MCNC: NEGATIVE MG/DL
KETONES UR QL: NEGATIVE
LEUKOCYTE EST, POC: NEGATIVE
NITRITE UR-MCNC: NEGATIVE MG/ML
PH UR: 6 [PH] (ref 5–8)
PROT UR STRIP-MCNC: NEGATIVE MG/DL
RBC # UR STRIP: NEGATIVE /UL
SP GR UR: 1.01 (ref 1–1.03)
UROBILINOGEN UR QL: NORMAL

## 2018-10-08 PROCEDURE — 99212 OFFICE O/P EST SF 10 MIN: CPT | Performed by: INTERNAL MEDICINE

## 2018-10-08 PROCEDURE — 81003 URINALYSIS AUTO W/O SCOPE: CPT | Performed by: INTERNAL MEDICINE

## 2018-10-08 NOTE — PROGRESS NOTES
"Subjective   Raimundo Allison is a 43 y.o. male who presents today for:    Blood in Urine (x 3 weeks)    History of Present Illness   He was started on Xifaxan for IBS about 3 weeks ago.  He first noticed a deep orange color to his urine a few days into the course, darker in the morning and clearing as the day goes on. No dysuria.  No polyuria.    He denies any h/o kidney stones.      He stopped smoking about 3 years ago, but still uses nicotine lozenges.  He has a 25-30 py h/o tobacco abuse    He denies any h/o bladder and kidney cancer only colon CA in his maternal uncle and polyps in his mother.    No Known Allergies    Current Outpatient Prescriptions:   •  esomeprazole (nexIUM) 40 MG capsule, Take 40 mg by mouth Every Morning Before Breakfast. [Not taking]  •  pantoprazole (PROTONIX) 40 MG EC tablet, TAKE 1 TABLET DAILY  •  atorvastatin (LIPITOR) 10 MG tablet, TAKE 1 TABLET DAILY [Not taking]  •  dicyclomine (BENTYL) 20 MG tablet, TAKE 1 TABLET TWICE A DAY      Review of Systems   Constitutional: Negative for unexpected weight change.   Gastrointestinal: Negative for abdominal distention, abdominal pain, constipation, diarrhea and nausea.   Genitourinary: Negative for dysuria and urgency.   Musculoskeletal: Negative for back pain.   Skin: Negative for rash.         Objective   Vitals:    10/08/18 1159   BP: 104/72   BP Location: Left arm   Patient Position: Sitting   Cuff Size: Adult   Pulse: 106   SpO2: 98%   Weight: 84.6 kg (186 lb 6.4 oz)   Height: 180.3 cm (71\")     Physical Exam  Well-developed, well-nourished, in no acute distress.  Sclerae anicteric and the conjunctivae are pink.  Chest is clear to auscultation bilaterally.  Normal respiratory effort noted.  No abdominal pain with deep inspiration.  Abdomen is soft and nontender.  No CVA tenderness.  No masses or hepatosplenomegaly appreciated.  No suprapubic fullness or tenderness.      Raimundo was seen today for blood in urine.    Diagnoses and all " orders for this visit:    Abnormal urine color  -     POC Urinalysis Dipstick is normal.    Reassured patient that there was no evidence of blood in the urine today.  Unsure why the urine changed color so abruptly with the start of Xifaxan.  Call if new urinary symptoms develop and we will check urinalysis.

## 2018-10-19 ENCOUNTER — OFFICE VISIT (OUTPATIENT)
Dept: GASTROENTEROLOGY | Facility: CLINIC | Age: 43
End: 2018-10-19

## 2018-10-19 VITALS
TEMPERATURE: 97.7 F | BODY MASS INDEX: 26.21 KG/M2 | DIASTOLIC BLOOD PRESSURE: 72 MMHG | HEIGHT: 71 IN | WEIGHT: 187.2 LBS | SYSTOLIC BLOOD PRESSURE: 118 MMHG

## 2018-10-19 DIAGNOSIS — K58.0 IRRITABLE BOWEL SYNDROME WITH DIARRHEA: Primary | ICD-10-CM

## 2018-10-19 DIAGNOSIS — K63.89 SMALL INTESTINAL BACTERIAL OVERGROWTH: ICD-10-CM

## 2018-10-19 DIAGNOSIS — R39.89 ABNORMAL URINE COLOR: Primary | ICD-10-CM

## 2018-10-19 DIAGNOSIS — D12.6 ADENOMATOUS POLYP OF COLON, UNSPECIFIED PART OF COLON: ICD-10-CM

## 2018-10-19 PROCEDURE — 99214 OFFICE O/P EST MOD 30 MIN: CPT | Performed by: NURSE PRACTITIONER

## 2018-10-19 NOTE — PROGRESS NOTES
Chief Complaint   Patient presents with   • Follow-up     xifaxan follow up        Raimundo Allison is a  43 y.o. male here for a follow up visit for IBS.    HPI  42 yo m presents today for follow up visit for IBS-D. He is a patient of DR. Awan. He was last seen here in the office on 7/16/18. He has hx chronic IBS-D. He was started on 1 round of Xifaxan after his last appt here. He admits since taking the 1 round of 14 days of Xifaxan he is feeling much better. Still having the occasional loose stool but its much better. He denies any dysphagia, reflux, abd pain, N&V, diarrhea, constipation, rectal bleeding or melena. He admits his appetite is ok and his weight is stable.     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)    • Bronchitis    • Chest pain    • Costochondritis    • Factor 5 Leiden mutation, heterozygous (CMS/HCC)    • Family history of heart disease 10/13/2017    Father   • GERD (gastroesophageal reflux disease)    • GERD (gastroesophageal reflux disease)    • High cholesterol    • Hyperlipidemia    • Irritable bowel syndrome        Past Surgical History:   Procedure Laterality Date   • COLONOSCOPY N/A 9/23/2016    TVA w/low grade dysplasia, focal ulceration   • SHOULDER ACROMIOPLASTY Bilateral    • SHOULDER ARTHROSCOPY  2015    ALISON Holland MD       Scheduled Meds:    Continuous Infusions:  No current facility-administered medications for this visit.     PRN Meds:.    No Known Allergies    Social History     Social History   • Marital status:      Spouse name: N/A   • Number of children: N/A   • Years of education: N/A     Occupational History   • Not on file.     Social History Main Topics   • Smoking status: Former Smoker     Types: Cigars, Cigarettes   • Smokeless tobacco: Never Used   • Alcohol use 9.6 oz/week     12 Standard drinks or equivalent, 4 Cans of beer per week      Comment: daily   • Drug use: No   • Sexual activity: Not on file      Other Topics Concern   • Not on file     Social History Narrative   • No narrative on file       Family History   Problem Relation Age of Onset   • Colon polyps Mother    • Factor V Leiden deficiency Father    • Heart attack Father    • Factor V Leiden deficiency Sister    • Factor V Leiden deficiency Brother    • Colon cancer Maternal Uncle         late 50s       Review of Systems   Constitutional: Negative for appetite change, chills, diaphoresis, fatigue, fever and unexpected weight change.   HENT: Negative for nosebleeds, postnasal drip, sore throat, trouble swallowing and voice change.    Respiratory: Negative for cough, choking, chest tightness, shortness of breath and wheezing.    Cardiovascular: Negative for chest pain.   Gastrointestinal: Negative for abdominal distention, abdominal pain, anal bleeding, blood in stool, constipation, diarrhea, nausea, rectal pain and vomiting.   Endocrine: Negative for polydipsia, polyphagia and polyuria.   Musculoskeletal: Negative for gait problem.   Skin: Negative for rash and wound.   Allergic/Immunologic: Negative for food allergies.   Neurological: Negative for dizziness, speech difficulty and light-headedness.   Psychiatric/Behavioral: Negative for confusion, self-injury, sleep disturbance and suicidal ideas.       Vitals:    10/19/18 1332   BP: 118/72   Temp: 97.7 °F (36.5 °C)       Physical Exam   Constitutional: He is oriented to person, place, and time. He appears well-developed and well-nourished. He does not appear ill. No distress.   HENT:   Head: Normocephalic.   Eyes: Pupils are equal, round, and reactive to light.   Cardiovascular: Normal rate, regular rhythm and normal heart sounds.    Pulmonary/Chest: Effort normal and breath sounds normal.   Abdominal: Soft. Bowel sounds are normal. He exhibits no distension and no mass. There is no hepatosplenomegaly. There is no tenderness. There is no rebound and no guarding. No hernia.   Musculoskeletal: Normal  range of motion.   Neurological: He is alert and oriented to person, place, and time.   Skin: Skin is warm and dry.   Psychiatric: He has a normal mood and affect. His speech is normal and behavior is normal. Judgment normal.       No images are attached to the encounter.    Assessment & plan    1. Irritable bowel syndrome with diarrhea    2. Small intestinal bacterial overgrowth    3. Adenomatous polyp of colon, unspecified part of colon    IBS-D/SIBO seems much improved after 1 round of Xifaxan. Next colonoscopy will be due in 9/2019. Follow up with me in 2-3 months. Call office with any issues.

## 2018-10-21 ENCOUNTER — RESULTS ENCOUNTER (OUTPATIENT)
Dept: FAMILY MEDICINE CLINIC | Facility: CLINIC | Age: 43
End: 2018-10-21

## 2018-10-21 DIAGNOSIS — R39.89 ABNORMAL URINE COLOR: ICD-10-CM

## 2018-10-28 LAB
ALBUMIN SERPL-MCNC: 4.4 G/DL (ref 3.5–5.2)
ALBUMIN/GLOB SERPL: 1.6 G/DL
ALP SERPL-CCNC: 78 U/L (ref 39–117)
ALT SERPL-CCNC: 20 U/L (ref 1–41)
APPEARANCE UR: CLEAR
AST SERPL-CCNC: 17 U/L (ref 1–40)
BACTERIA UR CULT: NO GROWTH
BACTERIA UR CULT: NORMAL
BASOPHILS # BLD AUTO: 0.06 10*3/MM3 (ref 0–0.2)
BASOPHILS NFR BLD AUTO: 0.9 % (ref 0–1.5)
BILIRUB SERPL-MCNC: 0.4 MG/DL (ref 0.1–1.2)
BILIRUB UR QL STRIP: NEGATIVE
BUN SERPL-MCNC: 11 MG/DL (ref 6–20)
BUN/CREAT SERPL: 11.1 (ref 7–25)
CALCIUM SERPL-MCNC: 9.7 MG/DL (ref 8.6–10.5)
CHLORIDE SERPL-SCNC: 102 MMOL/L (ref 98–107)
CHOLEST SERPL-MCNC: 258 MG/DL (ref 0–200)
CO2 SERPL-SCNC: 29.2 MMOL/L (ref 22–29)
COLOR UR: YELLOW
CREAT SERPL-MCNC: 0.99 MG/DL (ref 0.76–1.27)
EOSINOPHIL # BLD AUTO: 0.43 10*3/MM3 (ref 0–0.7)
EOSINOPHIL NFR BLD AUTO: 6.2 % (ref 0.3–6.2)
ERYTHROCYTE [DISTWIDTH] IN BLOOD BY AUTOMATED COUNT: 12.5 % (ref 11.5–14.5)
GLOBULIN SER CALC-MCNC: 2.8 GM/DL
GLUCOSE SERPL-MCNC: 96 MG/DL (ref 65–99)
GLUCOSE UR QL: NEGATIVE
HCT VFR BLD AUTO: 46 % (ref 40.4–52.2)
HDLC SERPL-MCNC: 53 MG/DL (ref 40–60)
HGB BLD-MCNC: 14.4 G/DL (ref 13.7–17.6)
HGB UR QL STRIP: NEGATIVE
IMM GRANULOCYTES # BLD: 0 10*3/MM3 (ref 0–0.03)
IMM GRANULOCYTES NFR BLD: 0 % (ref 0–0.5)
KETONES UR QL STRIP: NEGATIVE
LDLC SERPL CALC-MCNC: 176 MG/DL (ref 0–100)
LEUKOCYTE ESTERASE UR QL STRIP: NEGATIVE
LYMPHOCYTES # BLD AUTO: 1.83 10*3/MM3 (ref 0.9–4.8)
LYMPHOCYTES NFR BLD AUTO: 26.2 % (ref 19.6–45.3)
MCH RBC QN AUTO: 29.3 PG (ref 27–32.7)
MCHC RBC AUTO-ENTMCNC: 31.3 G/DL (ref 32.6–36.4)
MCV RBC AUTO: 93.7 FL (ref 79.8–96.2)
MONOCYTES # BLD AUTO: 0.59 10*3/MM3 (ref 0.2–1.2)
MONOCYTES NFR BLD AUTO: 8.5 % (ref 5–12)
NEUTROPHILS # BLD AUTO: 4.07 10*3/MM3 (ref 1.9–8.1)
NEUTROPHILS NFR BLD AUTO: 58.2 % (ref 42.7–76)
NITRITE UR QL STRIP: NEGATIVE
PH UR STRIP: 7 [PH] (ref 5–8)
PLATELET # BLD AUTO: 321 10*3/MM3 (ref 140–500)
POTASSIUM SERPL-SCNC: 4.5 MMOL/L (ref 3.5–5.2)
PROT SERPL-MCNC: 7.2 G/DL (ref 6–8.5)
PROT UR QL STRIP: NEGATIVE
RBC # BLD AUTO: 4.91 10*6/MM3 (ref 4.6–6)
SODIUM SERPL-SCNC: 138 MMOL/L (ref 136–145)
SP GR UR: 1.01 (ref 1–1.03)
TRIGL SERPL-MCNC: 146 MG/DL (ref 0–150)
UROBILINOGEN UR STRIP-MCNC: NORMAL MG/DL
VLDLC SERPL CALC-MCNC: 29.2 MG/DL (ref 5–40)
WBC # BLD AUTO: 6.98 10*3/MM3 (ref 4.5–10.7)

## 2018-11-01 ENCOUNTER — OFFICE VISIT (OUTPATIENT)
Dept: FAMILY MEDICINE CLINIC | Facility: CLINIC | Age: 43
End: 2018-11-01

## 2018-11-01 VITALS
DIASTOLIC BLOOD PRESSURE: 82 MMHG | HEIGHT: 71 IN | OXYGEN SATURATION: 97 % | HEART RATE: 79 BPM | BODY MASS INDEX: 26.21 KG/M2 | WEIGHT: 187.2 LBS | SYSTOLIC BLOOD PRESSURE: 116 MMHG

## 2018-11-01 DIAGNOSIS — R10.84 GENERALIZED ABDOMINAL PAIN: Primary | ICD-10-CM

## 2018-11-01 DIAGNOSIS — K21.9 GASTROESOPHAGEAL REFLUX DISEASE, ESOPHAGITIS PRESENCE NOT SPECIFIED: ICD-10-CM

## 2018-11-01 PROCEDURE — 99214 OFFICE O/P EST MOD 30 MIN: CPT | Performed by: INTERNAL MEDICINE

## 2018-11-01 RX ORDER — AMITRIPTYLINE HYDROCHLORIDE 25 MG/1
TABLET, FILM COATED ORAL
Qty: 60 TABLET | Refills: 1 | Status: SHIPPED | OUTPATIENT
Start: 2018-11-01 | End: 2019-06-03

## 2018-11-01 NOTE — PROGRESS NOTES
"Subjective   Raimundo Allison is a 43 y.o. male who presents today for:    Abdominal Pain (Lower abdominal pain x 1 month & review labs)    History of Present Illness     Intermittent LLQ abdominal pain 2 weeks ago has developed into generalized pain  is in the past week.  He carries a diagnosis of IBS, diarrhea predominant, but has felt better since he took a course of Xifaxan.  He now has one bowel movement per day, well formed.  This lower abdominal discomfort is new and has not been evaluated for now.    He still struggles with reflux for which he takes either Nexium or pantoprazole.  He usually cycles between one or the other.      We saw him 10/18/18 for discolored urine.  Evaluation was negative.  No treatment was provided to the time.  He still reports intermittent, bright orange urine however, he only has mild urinary frequency.    Mr. Allison  reports that he has quit smoking. His smoking use included Cigars and Cigarettes. He has never used smokeless tobacco. He reports that he drinks about 9.6 oz of alcohol per week . He reports that he does not use drugs.     No Known Allergies     Current Outpatient Prescriptions:   •  atorvastatin (LIPITOR) 10 MG tablet, TAKE 1 TABLET DAILY, Disp: 90 tablet, Rfl: 0  •  esomeprazole (nexIUM) 40 MG capsule, Take 40 mg by mouth Every Morning Before Breakfast., Disp: , Rfl:   •  pantoprazole (PROTONIX) 40 MG EC tablet, TAKE 1 TABLET DAILY, Disp: 90 tablet, Rfl: 0      Review of Systems   Constitutional: Negative for unexpected weight change.   Gastrointestinal: Negative for constipation and diarrhea.        Reflux, controlled.   Genitourinary: Positive for frequency. Negative for decreased urine volume.   Psychiatric/Behavioral: The patient is nervous/anxious.          Objective   Vitals:    11/01/18 1204   BP: 116/82   BP Location: Left arm   Patient Position: Sitting   Cuff Size: Adult   Pulse: 79   SpO2: 97%   Weight: 84.9 kg (187 lb 3.2 oz)   Height: 180.3 cm (71\") "     Physical Exam   Constitutional: He is oriented to person, place, and time. He appears well-developed and well-nourished. No distress.   Eyes: Conjunctivae are normal. No scleral icterus.   Cardiovascular: Normal rate and regular rhythm.    Pulmonary/Chest: Effort normal and breath sounds normal.   Abdominal: Soft. Bowel sounds are normal. He exhibits no distension, no abdominal bruit and no mass. There is no hepatosplenomegaly. There is generalized tenderness. There is no guarding.   Neurological: He is alert and oriented to person, place, and time.           Raimundo was seen today for abdominal pain.    Diagnoses and all orders for this visit:    Generalized abdominal pain    Gastroesophageal reflux disease, esophagitis presence not specified    Other orders  -     amitriptyline (ELAVIL) 25 MG tablet; 1-2 tabs PO qhs    Labs done prior to today's office visit, one week after his lower GI symptoms started, were reviewed with the patient today.  All are in order.  Urinalysis and urine culture are negative.      Office visit note from Dr. Awan where Xifaxan was prescribed and Viberzi was mentioned was also reviewed, as summarized herein.  Additionally, the presence of a 30 mm tubulovillous adenoma resected at the time of his colonoscopy in 9/2016, was also reviewed.    He was reassured regarding the lab results and his extensive prior evaluation.  I believe this still represents a manifestation of his IBS.  I have asked him to try Elavil at bedtime, titrating the dose up to 50 mg at night if no better in 10/14 days.  He may contact us prior to his follow-up with the gastroenterology office 12/14/18 if there are any other problems or if his symptoms evolve.  Otherwise, he should report his response to the Elavil at that 12/14/18 appointment.

## 2018-11-04 DIAGNOSIS — E78.00 ELEVATED CHOLESTEROL: ICD-10-CM

## 2018-11-04 DIAGNOSIS — K21.9 GASTROESOPHAGEAL REFLUX DISEASE, ESOPHAGITIS PRESENCE NOT SPECIFIED: ICD-10-CM

## 2018-11-05 RX ORDER — ATORVASTATIN CALCIUM 10 MG/1
TABLET, FILM COATED ORAL
Qty: 90 TABLET | Refills: 0 | Status: SHIPPED | OUTPATIENT
Start: 2018-11-05 | End: 2019-02-02 | Stop reason: SDUPTHER

## 2018-11-05 RX ORDER — PANTOPRAZOLE SODIUM 40 MG/1
TABLET, DELAYED RELEASE ORAL
Qty: 90 TABLET | Refills: 0 | Status: SHIPPED | OUTPATIENT
Start: 2018-11-05 | End: 2019-02-02 | Stop reason: SDUPTHER

## 2018-12-03 RX ORDER — DICYCLOMINE HCL 20 MG
TABLET ORAL
Qty: 180 TABLET | Refills: 0 | Status: SHIPPED | OUTPATIENT
Start: 2018-12-03 | End: 2019-03-03 | Stop reason: SDUPTHER

## 2018-12-14 ENCOUNTER — OFFICE VISIT (OUTPATIENT)
Dept: GASTROENTEROLOGY | Facility: CLINIC | Age: 43
End: 2018-12-14

## 2018-12-14 VITALS
DIASTOLIC BLOOD PRESSURE: 70 MMHG | WEIGHT: 189.6 LBS | TEMPERATURE: 97.4 F | SYSTOLIC BLOOD PRESSURE: 110 MMHG | HEIGHT: 71 IN | BODY MASS INDEX: 26.54 KG/M2

## 2018-12-14 DIAGNOSIS — K58.0 IRRITABLE BOWEL SYNDROME WITH DIARRHEA: Primary | ICD-10-CM

## 2018-12-14 DIAGNOSIS — K63.89 SMALL INTESTINAL BACTERIAL OVERGROWTH: ICD-10-CM

## 2018-12-14 PROCEDURE — 99214 OFFICE O/P EST MOD 30 MIN: CPT | Performed by: NURSE PRACTITIONER

## 2018-12-14 NOTE — PROGRESS NOTES
Chief Complaint   Patient presents with   • Irritable Bowel Syndrome       Raimundo Allison is a  43 y.o. male here for a follow up visit for IBS.    HPI  44 yo m presents today accompanied by his wife for follow up visit for IBS-D. He is a patient of Dr. Awan. He was last seen in the office by me on 10/19/18. At that time he had finished 1 round of Xifaxan and was feeling about 80% improved. He admits however that since then he still continues to have fecal urgency and diarrhea at certain times. He and his wife feel his IBS is connected to his anxiety. They feel like every time he has a family function or life event other than staying home he will have the fecal urgency and the diarrhea. He admits he is still 80-90% better since the xifaxan. He has been working his PCP to help control his anxiety and trying several different meds. None have really worked yet. He denies any dysphagia, reflux, abd pain, N&V, constipation, rectal bleeding or melena. He admits his appetite is good and his weight is stable.     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)    • Bronchitis    • Chest pain    • Costochondritis    • Factor 5 Leiden mutation, heterozygous (CMS/[facility])    • Family history of heart disease 10/13/2017    Father   • GERD (gastroesophageal reflux disease)    • GERD (gastroesophageal reflux disease)    • High cholesterol    • Hyperlipidemia    • Irritable bowel syndrome        Past Surgical History:   Procedure Laterality Date   • SHOULDER ACROMIOPLASTY Bilateral    • SHOULDER ARTHROSCOPY  2015    ALISON Holland MD       Scheduled Meds:    Continuous Infusions:  No current facility-administered medications for this visit.     PRN Meds:.    No Known Allergies    Social History     Socioeconomic History   • Marital status:      Spouse name: Not on file   • Number of children: Not on file   • Years of education: Not on file   • Highest education level: Not on file    Social Needs   • Financial resource strain: Not on file   • Food insecurity - worry: Not on file   • Food insecurity - inability: Not on file   • Transportation needs - medical: Not on file   • Transportation needs - non-medical: Not on file   Occupational History   • Not on file   Tobacco Use   • Smoking status: Former Smoker     Types: Cigars, Cigarettes   • Smokeless tobacco: Never Used   Substance and Sexual Activity   • Alcohol use: Yes     Alcohol/week: 9.6 oz     Types: 12 Standard drinks or equivalent, 4 Cans of beer per week     Comment: daily   • Drug use: No   • Sexual activity: Not on file   Other Topics Concern   • Not on file   Social History Narrative   • Not on file       Family History   Problem Relation Age of Onset   • Colon polyps Mother    • Factor V Leiden deficiency Father    • Heart attack Father    • Factor V Leiden deficiency Sister    • Factor V Leiden deficiency Brother    • Colon cancer Maternal Uncle         late 50s       Review of Systems   Constitutional: Negative for appetite change, chills, diaphoresis, fatigue, fever and unexpected weight change.   HENT: Negative for nosebleeds, postnasal drip, sore throat, trouble swallowing and voice change.    Respiratory: Negative for cough, choking, chest tightness, shortness of breath and wheezing.    Cardiovascular: Negative for chest pain.   Gastrointestinal: Negative for abdominal distention, abdominal pain, anal bleeding, blood in stool, constipation, diarrhea, nausea, rectal pain and vomiting.   Endocrine: Negative for polydipsia, polyphagia and polyuria.   Musculoskeletal: Negative for gait problem.   Skin: Negative for rash and wound.   Allergic/Immunologic: Negative for food allergies.   Neurological: Negative for dizziness, speech difficulty and light-headedness.   Psychiatric/Behavioral: Negative for confusion, self-injury, sleep disturbance and suicidal ideas.       Vitals:    12/14/18 1332   BP: 110/70   Temp: 97.4 °F (36.3 °C)        Physical Exam   Constitutional: He is oriented to person, place, and time. He appears well-developed and well-nourished. He does not appear ill. No distress.   HENT:   Head: Normocephalic.   Eyes: Pupils are equal, round, and reactive to light.   Cardiovascular: Normal rate, regular rhythm and normal heart sounds.   Pulmonary/Chest: Effort normal and breath sounds normal.   Abdominal: Soft. Bowel sounds are normal. He exhibits no distension and no mass. There is no hepatosplenomegaly. There is no tenderness. There is no rebound and no guarding. No hernia.   Musculoskeletal: Normal range of motion.   Neurological: He is alert and oriented to person, place, and time.   Skin: Skin is warm and dry.   Psychiatric: He has a normal mood and affect. His speech is normal and behavior is normal. Judgment normal.       No images are attached to the encounter.    Assessment & Plan    1. Irritable bowel syndrome with diarrhea    2. Small intestinal bacterial overgrowth    IBS-D seems much improved since finishing the first round of Xifaxan. Patient does not want to try another round. He really believes that's what is left of his IBS is due to anxiety and stress. He would like to try the Levsin or Bentyl again. He also plans on discussing his anxiety issues with his PCP again. Follow up with me in 1 month. Call office with update in 1-2 weeks.

## 2019-01-04 ENCOUNTER — OFFICE VISIT (OUTPATIENT)
Dept: FAMILY MEDICINE CLINIC | Facility: CLINIC | Age: 44
End: 2019-01-04

## 2019-01-04 VITALS
HEIGHT: 71 IN | WEIGHT: 191.7 LBS | HEART RATE: 110 BPM | SYSTOLIC BLOOD PRESSURE: 120 MMHG | DIASTOLIC BLOOD PRESSURE: 76 MMHG | OXYGEN SATURATION: 99 % | BODY MASS INDEX: 26.84 KG/M2

## 2019-01-04 DIAGNOSIS — F41.1 GENERALIZED ANXIETY DISORDER: Primary | ICD-10-CM

## 2019-01-04 DIAGNOSIS — K58.0 IRRITABLE BOWEL SYNDROME WITH DIARRHEA: ICD-10-CM

## 2019-01-04 PROBLEM — K58.9 IRRITABLE BOWEL SYNDROME: Status: ACTIVE | Noted: 2019-01-04

## 2019-01-04 PROCEDURE — 99214 OFFICE O/P EST MOD 30 MIN: CPT | Performed by: INTERNAL MEDICINE

## 2019-01-04 NOTE — PROGRESS NOTES
Subjective   Raimundo Allison is a 43 y.o. male who presents today for:    GI Problem (3 month f/u)    History of Present Illness     Through multiple medication trials and evaluation by a gastroenterologist, he was diagnosed with IBS-the.  The course of Xifaxan gave him 80-90 percent improvement.  He still has fecal urgency, usually surrounding times when he has to leave the house for family functions and times of increased anxiety.  Next    In November, I asked him to start taking Elavil at bedtime titrating the dose up to 50 mg at night after about 2 weeks.  Tolerated; he felt hung over after taking it.  Today, he reports having tried it in the past, but he did not recall doing so.  Next    He also recalls having been on multiple medications for anxiety last, although he cannot recall the names of all of them.  One of them was Lexapro, which did not give him any benefit.  He has also been referred to psychologist and psychiatrist in the past, but has never followed through with seeing 1 for any length of time.  Next    He presents with his wife today who stresses the cyclic nature of the symptoms, with escalation in symptoms over the years.  Presently, he cannot leave the house without cramping abdominal discomfort that he feels signals the need for bowel movement, but frequently does not result in a bowel movement.  In thinking about today's office visit, yesterday and today have resulted in multiple trips to the bathroom for the same symptoms.  He has tried multiple GI medications including Bentyl and Levsin; neither of these have given him relief.    He has turned to drinking alcohol every night, usually 4-6 beers per night, in order to help him relax and sleep.  Despite this, he still awakens multiple times throughout the night.  He cannot provide a response as to why he awakens and why he has trouble falling back to sleep, except that he has problems with her anxieties in the middle the night.    Mr. Allison   "reports that he has quit smoking. His smoking use included cigars and cigarettes. he has never used smokeless tobacco. He reports that he drinks about 9.6 oz of alcohol per week. He reports that he does not use drugs.     No Known Allergies    Current Outpatient Medications:   •  pantoprazole (PROTONIX) 40 MG EC tablet, TAKE 1 TABLET DAILY, Disp: 90 tablet, Rfl: 0  •  amitriptyline (ELAVIL) 25 MG tablet, 1-2 tabs PO qhs, Disp: 60 tablet, Rfl: 1  •  atorvastatin (LIPITOR) 10 MG tablet, TAKE 1 TABLET DAILY, Disp: 90 tablet, Rfl: 0  •  dicyclomine (BENTYL) 20 MG tablet, TAKE 1 TABLET TWICE A DAY, Disp: 180 tablet, Rfl: 0  •  esomeprazole (nexIUM) 40 MG capsule, Take 40 mg by mouth Every Morning Before Breakfast., Disp: , Rfl:       Review of Systems   Gastrointestinal: Positive for abdominal distention, abdominal pain and nausea. Negative for blood in stool.         Objective   Vitals:    01/04/19 1318   BP: 120/76   BP Location: Right arm   Patient Position: Sitting   Cuff Size: Adult   Pulse: 110   SpO2: 99%   Weight: 87 kg (191 lb 11.2 oz)   Height: 180.3 cm (71\")     Physical Exam  Well kempt.  In no acute distress.  Insight is fair.  Judgment is fairly good.  Mood is anxious.  Affect is appropriate.        Raimundo was seen today for gi problem.    Diagnoses and all orders for this visit:    Generalized anxiety disorder    Irritable bowel syndrome with diarrhea    His wife was present throughout the entire 30 minute office visit today.  She provided some of the history noted above and many of the insights that have this patient understanding that anxiety is driving his symptoms.  Both are in agreement that treatment needs to be initiated.  Both are requesting he have a prescription of Valium because he has taken this in the past and it controls his symptoms perfectly.  I explained that Valium cannot be the cornerstone of his treatment as it is very much like alcohol and that it can lead to dependence and " tolerance.    We will refer him to a behavioral Health Center where a psychologist and/or psychiatrist can guide his treatment.  We will not provide a prescription for any medication, including Valium, at this time.  He is adamantly opposed to cutting back on alcohol at this time.    30 minutes of the 30 minute office visit were spent discussing his underlying anxieties, discussing possible medications, and reaffirming the need for counseling.

## 2019-02-02 DIAGNOSIS — E78.00 ELEVATED CHOLESTEROL: ICD-10-CM

## 2019-02-02 DIAGNOSIS — K21.9 GASTROESOPHAGEAL REFLUX DISEASE, ESOPHAGITIS PRESENCE NOT SPECIFIED: ICD-10-CM

## 2019-02-04 RX ORDER — ATORVASTATIN CALCIUM 10 MG/1
TABLET, FILM COATED ORAL
Qty: 90 TABLET | Refills: 0 | Status: SHIPPED | OUTPATIENT
Start: 2019-02-04 | End: 2021-11-08 | Stop reason: ALTCHOICE

## 2019-02-04 RX ORDER — PANTOPRAZOLE SODIUM 40 MG/1
TABLET, DELAYED RELEASE ORAL
Qty: 90 TABLET | Refills: 0 | Status: SHIPPED | OUTPATIENT
Start: 2019-02-04 | End: 2019-04-24 | Stop reason: SDUPTHER

## 2019-03-04 RX ORDER — DICYCLOMINE HCL 20 MG
TABLET ORAL
Qty: 180 TABLET | Refills: 0 | Status: SHIPPED | OUTPATIENT
Start: 2019-03-04 | End: 2019-03-12

## 2019-03-12 ENCOUNTER — OFFICE VISIT (OUTPATIENT)
Dept: GASTROENTEROLOGY | Facility: CLINIC | Age: 44
End: 2019-03-12

## 2019-03-12 VITALS
BODY MASS INDEX: 25.18 KG/M2 | DIASTOLIC BLOOD PRESSURE: 72 MMHG | SYSTOLIC BLOOD PRESSURE: 128 MMHG | HEIGHT: 73 IN | WEIGHT: 190 LBS | TEMPERATURE: 97.9 F

## 2019-03-12 DIAGNOSIS — K21.9 GASTROESOPHAGEAL REFLUX DISEASE, ESOPHAGITIS PRESENCE NOT SPECIFIED: ICD-10-CM

## 2019-03-12 DIAGNOSIS — R15.2 FECAL URGENCY: ICD-10-CM

## 2019-03-12 DIAGNOSIS — K58.0 IRRITABLE BOWEL SYNDROME WITH DIARRHEA: Primary | ICD-10-CM

## 2019-03-12 PROCEDURE — 99214 OFFICE O/P EST MOD 30 MIN: CPT | Performed by: NURSE PRACTITIONER

## 2019-03-12 NOTE — PROGRESS NOTES
Chief Complaint   Patient presents with   • Irritable Bowel Syndrome       Raimundo Allison is a  44 y.o. male here for a follow up visit for IBS and GERD.    HPI  45 yo m presents today for follow up visit for GERD and IBS-D. He is a patient of Dr. Awan. He was last seen in the office on 12/14/18. He admits the bentyl is not working for his IBS symptoms. He would like to try the Levsin again because he thinks it helped him in the past. He admits his IBS only acts up when his anxiety is flaring up when he has to go somewhere outside the house or to a family function. He admits he has been trying several meds from both his PCP and psychiatrist but he admits none of it works. He admits he does not sleep well at all. He has no trouble falling asleep but he wakes up not long after falling asleep and then he's up all night. He admits to help his anxiety and his sleep he has started drinking 2-3 beers a night. He admits to me this is not healthy but it works. He tells me he has no plans to go back to his psychiatrist because he feels she was not listening to him and was having him take meds for depression and he admits he is not depressed. He admits these issues have caused problems with his wife. He denies any suicidal ideation but admits he needs to find a PCP that will listen to him and give him the right medicine for his anxiety. He also has hx GERD and admits the Protonix 40 mg daily is not working well. He is having more reflux issues at night. He denies any dysphagia, abd pain, N&V, constipation, rectal bleeding or melena. He admits his appetite is good and his weight is stable.       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)    • Bronchitis    • Chest pain    • Costochondritis    • Factor 5 Leiden mutation, heterozygous (CMS/HCC)    • Family history of heart disease 10/13/2017    Father   • GERD (gastroesophageal reflux disease)    • GERD (gastroesophageal reflux  disease)    • High cholesterol    • Hyperlipidemia    • Irritable bowel syndrome        Past Surgical History:   Procedure Laterality Date   • COLONOSCOPY N/A 9/23/2016    TVA w/low grade dysplasia, focal ulceration   • SHOULDER ACROMIOPLASTY Bilateral    • SHOULDER ARTHROSCOPY  2015    ALISON Holland MD       Scheduled Meds:    Continuous Infusions:  No current facility-administered medications for this visit.     PRN Meds:.    No Known Allergies    Social History     Socioeconomic History   • Marital status:      Spouse name: Not on file   • Number of children: Not on file   • Years of education: Not on file   • Highest education level: Not on file   Social Needs   • Financial resource strain: Not on file   • Food insecurity - worry: Not on file   • Food insecurity - inability: Not on file   • Transportation needs - medical: Not on file   • Transportation needs - non-medical: Not on file   Occupational History   • Not on file   Tobacco Use   • Smoking status: Former Smoker     Types: Cigars, Cigarettes     Last attempt to quit: 2016     Years since quitting: 3.1   • Smokeless tobacco: Never Used   Substance and Sexual Activity   • Alcohol use: Yes     Alcohol/week: 9.6 oz     Types: 4 Cans of beer, 12 Standard drinks or equivalent per week     Comment: daily   • Drug use: No   • Sexual activity: Not on file   Other Topics Concern   • Not on file   Social History Narrative   • Not on file       Family History   Problem Relation Age of Onset   • Colon polyps Mother    • Factor V Leiden deficiency Father    • Heart attack Father    • Factor V Leiden deficiency Sister    • Factor V Leiden deficiency Brother    • Colon cancer Maternal Uncle         late 50s       Review of Systems   Constitutional: Positive for fatigue. Negative for appetite change, chills, diaphoresis, fever and unexpected weight change.   HENT: Negative for nosebleeds, postnasal drip, sore throat, trouble swallowing and voice change.     Respiratory: Negative for cough, choking, chest tightness, shortness of breath and wheezing.    Cardiovascular: Negative for chest pain.   Gastrointestinal: Positive for abdominal distention. Negative for abdominal pain, anal bleeding, blood in stool, constipation, diarrhea, nausea, rectal pain and vomiting.   Endocrine: Negative for polydipsia, polyphagia and polyuria.   Musculoskeletal: Negative for gait problem.   Skin: Negative for rash and wound.   Allergic/Immunologic: Negative for food allergies.   Neurological: Negative for dizziness, speech difficulty and light-headedness.   Psychiatric/Behavioral: Negative for confusion, self-injury, sleep disturbance and suicidal ideas.       Vitals:    03/12/19 1338   BP: 128/72   Temp: 97.9 °F (36.6 °C)       Physical Exam   Constitutional: He is oriented to person, place, and time. He appears well-developed and well-nourished. He does not appear ill. No distress.   HENT:   Head: Normocephalic.   Eyes: Pupils are equal, round, and reactive to light.   Cardiovascular: Normal rate, regular rhythm and normal heart sounds.   Pulmonary/Chest: Effort normal and breath sounds normal.   Abdominal: Soft. Bowel sounds are normal. He exhibits no distension and no mass. There is no hepatosplenomegaly. There is no tenderness. There is no rebound and no guarding. No hernia.   Musculoskeletal: Normal range of motion.   Neurological: He is alert and oriented to person, place, and time.   Skin: Skin is warm and dry.   Psychiatric: He has a normal mood and affect. His speech is normal and behavior is normal. Judgment normal.       No images are attached to the encounter.    Assessment & Plan    1. Irritable bowel syndrome with diarrhea    2. Fecal urgency    3. Gastroesophageal reflux disease, esophagitis presence not specified    IBS-D is not well controlled. Will have him start some daily Fiber and will refill the Levsin for the cramping. Patient to add some Zantac at bedtime for his  GERD. Recommend drinking less ETOH at night as well. Patient to call the office next week with update. He will be due for screening colonoscopy this fall for hx large TVA. Follow up with me in 3 months.

## 2019-04-24 DIAGNOSIS — K21.9 GASTROESOPHAGEAL REFLUX DISEASE, ESOPHAGITIS PRESENCE NOT SPECIFIED: ICD-10-CM

## 2019-04-24 RX ORDER — PANTOPRAZOLE SODIUM 40 MG/1
40 TABLET, DELAYED RELEASE ORAL DAILY
Qty: 90 TABLET | Refills: 0 | Status: SHIPPED | OUTPATIENT
Start: 2019-04-24 | End: 2019-06-03

## 2019-05-17 DIAGNOSIS — K21.9 GASTROESOPHAGEAL REFLUX DISEASE, ESOPHAGITIS PRESENCE NOT SPECIFIED: ICD-10-CM

## 2019-06-03 ENCOUNTER — OFFICE VISIT (OUTPATIENT)
Dept: INTERNAL MEDICINE | Age: 44
End: 2019-06-03

## 2019-06-03 VITALS
HEIGHT: 73 IN | DIASTOLIC BLOOD PRESSURE: 70 MMHG | OXYGEN SATURATION: 99 % | BODY MASS INDEX: 24.65 KG/M2 | SYSTOLIC BLOOD PRESSURE: 100 MMHG | HEART RATE: 81 BPM | WEIGHT: 186 LBS | TEMPERATURE: 97.5 F

## 2019-06-03 DIAGNOSIS — Z76.89 ENCOUNTER TO ESTABLISH CARE: Primary | ICD-10-CM

## 2019-06-03 DIAGNOSIS — K21.9 GASTROESOPHAGEAL REFLUX DISEASE, ESOPHAGITIS PRESENCE NOT SPECIFIED: ICD-10-CM

## 2019-06-03 DIAGNOSIS — E78.2 MIXED HYPERLIPIDEMIA: ICD-10-CM

## 2019-06-03 DIAGNOSIS — G47.9 SLEEP DISTURBANCE: ICD-10-CM

## 2019-06-03 DIAGNOSIS — K58.0 IRRITABLE BOWEL SYNDROME WITH DIARRHEA: ICD-10-CM

## 2019-06-03 DIAGNOSIS — Z82.49 FAMILY HISTORY OF HEART DISEASE: ICD-10-CM

## 2019-06-03 PROBLEM — D68.51 FACTOR V LEIDEN (HCC): Status: ACTIVE | Noted: 2019-06-03

## 2019-06-03 PROCEDURE — 99214 OFFICE O/P EST MOD 30 MIN: CPT | Performed by: NURSE PRACTITIONER

## 2019-06-03 RX ORDER — HYDROXYZINE PAMOATE 50 MG/1
50 CAPSULE ORAL 3 TIMES DAILY PRN
Qty: 30 CAPSULE | Refills: 1 | Status: SHIPPED | OUTPATIENT
Start: 2019-06-03 | End: 2020-01-21

## 2019-06-03 RX ORDER — PANTOPRAZOLE SODIUM 40 MG/1
40 TABLET, DELAYED RELEASE ORAL
COMMUNITY
Start: 2019-04-24 | End: 2019-06-03

## 2019-06-03 RX ORDER — RANITIDINE HCL 75 MG
75 TABLET ORAL 2 TIMES DAILY
COMMUNITY
End: 2020-01-21

## 2019-06-03 RX ORDER — CALCIUM POLYCARBOPHIL 625 MG 625 MG/1
500 TABLET ORAL DAILY
COMMUNITY

## 2019-06-03 RX ORDER — PANTOPRAZOLE SODIUM 40 MG/1
40 TABLET, DELAYED RELEASE ORAL DAILY
COMMUNITY
End: 2020-01-21

## 2019-06-03 NOTE — PATIENT INSTRUCTIONS
"Per the American Heart Association Guidelines here are some suggestions for Low Cholesterol and a Low Saturated Fat Diet:    Here are some ways to lower your intake of saturated and trans fats:   •Maintain a diet that emphasizes fruits, vegetables, whole grains, low-fat dairy products, poultry, fish and nuts. Also limit red meat as well as sugary foods and beverages.   •Opt for naturally occurring unhydrogenated vegetable oils such as canola, safflower, sunflower or olive oil.   •Look for processed foods made with unhydrogenated oil rather than saturated fat or hydrogenated (or partially hydrogenated) vegetable oils.   •Use soft margarine as a substitute for butter and choose soft margarines (liquid or tub varieties) over harder stick forms. Look for \"0 g trans fat\" on the Nutrition Facts label.   •Doughnuts, cookies, crackers, muffins, pies and cakes are examples of foods high in trans fat. Don't eat them often.   •Limit commercially fried foods and baked goods made with shortening or partially hydrogenated vegetable oils. These foods are very high in fat, and it's likely to be trans fat.   •Limit fried fast food. Commercial shortening and deep-frying fats are still made by hydrogenation and contain saturated and trans fats.    https://www.heart.org/en/health-topics/cholesterol/prevention-and-treatment-of-high-cholesterol-hyperlipidemia/the-skinny-on-fats    *You can find more information at www.heart.org*    "

## 2019-06-03 NOTE — ASSESSMENT & PLAN NOTE
Sx worse at night. Taking Protonix 40 mg daily and nightly zantac as well. Still has night time symptoms. GI specialist: Dr. Naranjo.

## 2019-06-03 NOTE — ASSESSMENT & PLAN NOTE
Took one round of Xifaxan with 80% improvement in symptoms. Continues to have sx. Takes Fibercon supplement daily.

## 2019-06-03 NOTE — ASSESSMENT & PLAN NOTE
Most recent LDL elevated. Reviewed risk of stroke, heart disease/attack, and clots. Recommend triple vascular screening. Check lipid panel now. Not currently taking atorvastatin, as previously prescribed.

## 2020-01-21 ENCOUNTER — OFFICE VISIT (OUTPATIENT)
Dept: GASTROENTEROLOGY | Facility: CLINIC | Age: 45
End: 2020-01-21

## 2020-01-21 VITALS
WEIGHT: 193.4 LBS | BODY MASS INDEX: 27.07 KG/M2 | TEMPERATURE: 97.9 F | DIASTOLIC BLOOD PRESSURE: 82 MMHG | HEIGHT: 71 IN | SYSTOLIC BLOOD PRESSURE: 124 MMHG

## 2020-01-21 DIAGNOSIS — K58.0 IRRITABLE BOWEL SYNDROME WITH DIARRHEA: Primary | ICD-10-CM

## 2020-01-21 DIAGNOSIS — K21.9 GASTROESOPHAGEAL REFLUX DISEASE, ESOPHAGITIS PRESENCE NOT SPECIFIED: ICD-10-CM

## 2020-01-21 DIAGNOSIS — Z12.11 ENCOUNTER FOR SCREENING FOR MALIGNANT NEOPLASM OF COLON: ICD-10-CM

## 2020-01-21 DIAGNOSIS — R13.10 ODYNOPHAGIA: ICD-10-CM

## 2020-01-21 DIAGNOSIS — R10.13 DYSPEPSIA: ICD-10-CM

## 2020-01-21 DIAGNOSIS — D12.6 ADENOMATOUS POLYP OF COLON, UNSPECIFIED PART OF COLON: ICD-10-CM

## 2020-01-21 LAB
ALBUMIN SERPL-MCNC: 4.1 G/DL (ref 3.5–5.2)
ALBUMIN/GLOB SERPL: 1.5 G/DL
ALP SERPL-CCNC: 71 U/L (ref 39–117)
ALT SERPL-CCNC: 15 U/L (ref 1–41)
AST SERPL-CCNC: 16 U/L (ref 1–40)
BASOPHILS # BLD AUTO: 0.04 10*3/MM3 (ref 0–0.2)
BASOPHILS NFR BLD AUTO: 0.6 % (ref 0–1.5)
BILIRUB SERPL-MCNC: 0.7 MG/DL (ref 0.2–1.2)
BUN SERPL-MCNC: 11 MG/DL (ref 6–20)
BUN/CREAT SERPL: 11.2 (ref 7–25)
CALCIUM SERPL-MCNC: 9.3 MG/DL (ref 8.6–10.5)
CHLORIDE SERPL-SCNC: 102 MMOL/L (ref 98–107)
CO2 SERPL-SCNC: 25.9 MMOL/L (ref 22–29)
CREAT SERPL-MCNC: 0.98 MG/DL (ref 0.76–1.27)
EOSINOPHIL # BLD AUTO: 0.25 10*3/MM3 (ref 0–0.4)
EOSINOPHIL NFR BLD AUTO: 3.9 % (ref 0.3–6.2)
ERYTHROCYTE [DISTWIDTH] IN BLOOD BY AUTOMATED COUNT: 12.7 % (ref 12.3–15.4)
GLOBULIN SER CALC-MCNC: 2.7 GM/DL
GLUCOSE SERPL-MCNC: 101 MG/DL (ref 65–99)
HCT VFR BLD AUTO: 42.7 % (ref 37.5–51)
HGB BLD-MCNC: 14.9 G/DL (ref 13–17.7)
IMM GRANULOCYTES # BLD AUTO: 0.02 10*3/MM3 (ref 0–0.05)
IMM GRANULOCYTES NFR BLD AUTO: 0.3 % (ref 0–0.5)
LYMPHOCYTES # BLD AUTO: 1.38 10*3/MM3 (ref 0.7–3.1)
LYMPHOCYTES NFR BLD AUTO: 21.3 % (ref 19.6–45.3)
MCH RBC QN AUTO: 30.8 PG (ref 26.6–33)
MCHC RBC AUTO-ENTMCNC: 34.9 G/DL (ref 31.5–35.7)
MCV RBC AUTO: 88.4 FL (ref 79–97)
MONOCYTES # BLD AUTO: 0.64 10*3/MM3 (ref 0.1–0.9)
MONOCYTES NFR BLD AUTO: 9.9 % (ref 5–12)
NEUTROPHILS # BLD AUTO: 4.14 10*3/MM3 (ref 1.7–7)
NEUTROPHILS NFR BLD AUTO: 64 % (ref 42.7–76)
NRBC BLD AUTO-RTO: 0 /100 WBC (ref 0–0.2)
PLATELET # BLD AUTO: 304 10*3/MM3 (ref 140–450)
POTASSIUM SERPL-SCNC: 4.1 MMOL/L (ref 3.5–5.2)
PROT SERPL-MCNC: 6.8 G/DL (ref 6–8.5)
RBC # BLD AUTO: 4.83 10*6/MM3 (ref 4.14–5.8)
SODIUM SERPL-SCNC: 140 MMOL/L (ref 136–145)
TSH SERPL DL<=0.005 MIU/L-ACNC: 0.56 UIU/ML (ref 0.27–4.2)
WBC # BLD AUTO: 6.47 10*3/MM3 (ref 3.4–10.8)

## 2020-01-21 PROCEDURE — 99214 OFFICE O/P EST MOD 30 MIN: CPT | Performed by: NURSE PRACTITIONER

## 2020-01-21 RX ORDER — PANTOPRAZOLE SODIUM 40 MG/1
40 TABLET, DELAYED RELEASE ORAL 2 TIMES DAILY
Qty: 180 TABLET | Refills: 3 | Status: SHIPPED | OUTPATIENT
Start: 2020-01-21 | End: 2020-02-19

## 2020-01-21 NOTE — PROGRESS NOTES
Chief Complaint   Patient presents with   • Dyspepsia   • Irritable Bowel Syndrome     HPI    Raimundo Allison is a  45 y.o. male here for a follow up visit for dyspepsia, odynophagia, and irritable bowel syndrome.  This patient follows with Dr. Awan, new to me.  IBS responded well to Levsin and Xifaxan in the past.    Reviewed colonoscopy dated 9/23/2016 w/ NBIH, one 30 mm polyp in the sigmoid which was resected, clipped, and injected.  Recall 3 years. Path + TA.     On visit today he has a number of GI complaints.    He struggles with daily dyspepsia, odynophagia, and excessive belching.  He admits that he ran out of his PPI therapy is currently taking Nexium OTC but he is taking 2 tablets/day in the evening.  He rarely uses NSAIDs.  He does admit to excessive alcohol use.  He drinks on average 3-5 beers each evening possibly more on the weekends.  He quit smoking 4 years ago.  He routinely chews nicotine gum.  He has never had an upper endoscopy.    Irritable bowel syndrome is currently an issue.  His bowels are moving 4-5 times a day mainly in the morning, consistency varies.  There is fecal urgency but no rectal bleeding.  He denies abdominal pain.    No recent labs.    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)    • Bronchitis    • Chest pain    • Costochondritis    • Factor 5 Leiden mutation, heterozygous (CMS/HCC)    • Family history of heart disease 10/13/2017    Father   • GERD (gastroesophageal reflux disease)    • GERD (gastroesophageal reflux disease)    • High cholesterol    • Hyperlipidemia    • Irritable bowel syndrome        Past Surgical History:   Procedure Laterality Date   • COLONOSCOPY N/A 9/23/2016    TVA w/low grade dysplasia, focal ulceration   • SHOULDER ACROMIOPLASTY Bilateral    • SHOULDER ARTHROSCOPY  2015    ALISON Holland MD       Scheduled Meds:  Outpatient Encounter Medications as of 1/21/2020   Medication Sig Dispense Refill   • calcium  polycarbophil (FIBERCON) 625 MG tablet Take 625 mg by mouth Daily.     • [DISCONTINUED] esomeprazole (nexIUM) 20 MG capsule Take 20 mg by mouth 2 (Two) Times a Day.     • atorvastatin (LIPITOR) 10 MG tablet TAKE 1 TABLET DAILY 90 tablet 0   • pantoprazole (PROTONIX) 40 MG EC tablet Take 1 tablet by mouth 2 (Two) Times a Day. 180 tablet 3   • [DISCONTINUED] hydrOXYzine pamoate (VISTARIL) 50 MG capsule Take 1 capsule by mouth 3 (Three) Times a Day As Needed for Itching. 30 capsule 1   • [DISCONTINUED] pantoprazole (PROTONIX) 40 MG EC tablet Take 40 mg by mouth Daily.     • [DISCONTINUED] raNITIdine (ZANTAC) 75 MG tablet Take 75 mg by mouth 2 (Two) Times a Day.       No facility-administered encounter medications on file as of 2020.        Continuous Infusions:  No current facility-administered medications for this visit.     PRN Meds:.    No Known Allergies    Social History     Socioeconomic History   • Marital status:      Spouse name: Not on file   • Number of children: Not on file   • Years of education: Not on file   • Highest education level: Not on file   Tobacco Use   • Smoking status: Former Smoker     Types: Cigars, Cigarettes     Last attempt to quit: 2016     Years since quittin.0   • Smokeless tobacco: Never Used   Substance and Sexual Activity   • Alcohol use: Yes     Alcohol/week: 16.0 standard drinks     Types: 4 Cans of beer, 12 Standard drinks or equivalent per week     Comment: daily   • Drug use: No       Family History   Problem Relation Age of Onset   • Colon polyps Mother    • Factor V Leiden deficiency Father    • Heart attack Father    • Factor V Leiden deficiency Sister    • Factor V Leiden deficiency Brother    • Colon cancer Maternal Uncle         late 50s       Review of Systems   Constitutional: Negative for activity change, appetite change, fatigue, fever and unexpected weight change.   HENT: Negative for trouble swallowing.    Respiratory: Negative for apnea, cough,  choking, chest tightness, shortness of breath and wheezing.    Cardiovascular: Negative for chest pain, palpitations and leg swelling.   Gastrointestinal: Positive for diarrhea. Negative for abdominal distention, abdominal pain, anal bleeding, blood in stool, constipation, nausea, rectal pain and vomiting.        + Odynophagia, dyspepsia, and belching       Vitals:    01/21/20 1005   BP: 124/82   Temp: 97.9 °F (36.6 °C)       Physical Exam   Constitutional: He is oriented to person, place, and time. He appears well-developed and well-nourished.   Eyes: Pupils are equal, round, and reactive to light.   Cardiovascular: Normal rate, regular rhythm and normal heart sounds.   Pulmonary/Chest: Effort normal and breath sounds normal. No respiratory distress. He has no wheezes.   Abdominal: Soft. Bowel sounds are normal. He exhibits no distension and no mass. There is no tenderness. There is no guarding. No hernia.   Musculoskeletal: Normal range of motion.   Neurological: He is alert and oriented to person, place, and time.   Skin: Skin is warm and dry. Capillary refill takes less than 2 seconds.   Psychiatric: He has a normal mood and affect. His behavior is normal.       No radiology results for the last 7 days    Raimundo was seen today for dyspepsia and irritable bowel syndrome.    Diagnoses and all orders for this visit:    Irritable bowel syndrome with diarrhea  -     CBC & Differential  -     Comprehensive Metabolic Panel  -     Celiac Comprehensive Panel  -     TSH  -     Case Request; Standing  -     Case Request    Dyspepsia  -     CBC & Differential  -     Comprehensive Metabolic Panel  -     Celiac Comprehensive Panel  -     TSH  -     Case Request; Standing  -     Case Request    Odynophagia    Gastroesophageal reflux disease, esophagitis presence not specified  -     CBC & Differential  -     Comprehensive Metabolic Panel  -     Celiac Comprehensive Panel  -     TSH  -     Case Request; Standing  -     Case  Request    Encounter for screening for malignant neoplasm of colon  -     Case Request; Standing  -     Case Request    Adenomatous polyp of colon, unspecified part of colon  -     Case Request; Standing  -     Case Request    Other orders  -     pantoprazole (PROTONIX) 40 MG EC tablet; Take 1 tablet by mouth 2 (Two) Times a Day.      Assessment/plan    Pleasant 45-year-old male seen today in follow-up with a number of GI issues.    Currently complains of odynophagia, dyspepsia, and belching.  Moving forward recommend increasing PPI therapy to twice daily dosing.  Arrange EGD with Dr. Awan rule out Willoughby's esophagus, gastritis, esophagitis, H. pylori infection, celiac disease, etc.    Avoid NSAIDs.    Counseled the patient on alcohol cessation which is definitely contributing to his issues.  I do feel there is a component of anxiety here.    Patient has a history of tubular adenomatous colon polyp and is due for colon cancer screening.  As such we will arrange colonoscopy with Dr. Awan.  We need to regroup and decide treatment options for this patient's irritable bowel syndrome.  Endoscopic evaluation pathology will be beneficial.  Consider retreatment with Xifaxan versus Alinia depending on what colonoscopy reveals.    Labs today with CBC, CMP, celiac panel, and TSH.    Follow-up and further recommendations pending the aforementioned work-up.

## 2020-01-22 NOTE — PROGRESS NOTES
Please notify the patient that his lab work is normal.  No anemia, normal kidney and liver function.

## 2020-01-23 LAB
ENDOMYSIUM IGA SER QL: NEGATIVE
GLIADIN PEPTIDE IGA SER-ACNC: 1 UNITS (ref 0–19)
GLIADIN PEPTIDE IGG SER-ACNC: 2 UNITS (ref 0–19)
IGA SERPL-MCNC: 185 MG/DL (ref 90–386)
TTG IGA SER-ACNC: <2 U/ML (ref 0–3)
TTG IGG SER-ACNC: <2 U/ML (ref 0–5)

## 2020-02-05 ENCOUNTER — TELEPHONE (OUTPATIENT)
Dept: GASTROENTEROLOGY | Facility: CLINIC | Age: 45
End: 2020-02-05

## 2020-02-05 NOTE — TELEPHONE ENCOUNTER
----- Message from WING Motley sent at 1/22/2020  9:10 AM EST -----  Please notify the patient that his lab work is normal.  No anemia, normal kidney and liver function.

## 2020-02-13 NOTE — TELEPHONE ENCOUNTER
Pt has not read his [employer] message.    Called pt and advised of the note from Deidre. Pt verb understanding.

## 2020-02-19 ENCOUNTER — TELEPHONE (OUTPATIENT)
Dept: GASTROENTEROLOGY | Facility: CLINIC | Age: 45
End: 2020-02-19

## 2020-02-19 ENCOUNTER — OUTSIDE FACILITY SERVICE (OUTPATIENT)
Dept: GASTROENTEROLOGY | Facility: CLINIC | Age: 45
End: 2020-02-19

## 2020-02-19 PROCEDURE — 43239 EGD BIOPSY SINGLE/MULTIPLE: CPT | Performed by: INTERNAL MEDICINE

## 2020-02-19 PROCEDURE — 45380 COLONOSCOPY AND BIOPSY: CPT | Performed by: INTERNAL MEDICINE

## 2020-02-19 RX ORDER — AMITRIPTYLINE HYDROCHLORIDE 25 MG/1
25 TABLET, FILM COATED ORAL NIGHTLY
Qty: 30 TABLET | Refills: 2 | Status: SHIPPED | OUTPATIENT
Start: 2020-02-19 | End: 2021-11-08

## 2020-02-19 RX ORDER — PANTOPRAZOLE SODIUM 40 MG/1
40 TABLET, DELAYED RELEASE ORAL 2 TIMES DAILY
Qty: 180 TABLET | Refills: 11 | Status: SHIPPED | OUTPATIENT
Start: 2020-02-19 | End: 2020-05-15

## 2020-02-19 NOTE — TELEPHONE ENCOUNTER
----- Message from Grey Awan MD sent at 2/19/2020 11:29 AM EST -----  Regarding: rx  Call in protonix 40mg po BID #[id number] rf  elevail 25mg po qHS #30 2 RF

## 2020-02-24 ENCOUNTER — TELEPHONE (OUTPATIENT)
Dept: GASTROENTEROLOGY | Facility: CLINIC | Age: 45
End: 2020-02-24

## 2020-02-24 RX ORDER — SUCRALFATE ORAL 1 G/10ML
1 SUSPENSION ORAL
Qty: 150 ML | Refills: 0 | Status: SHIPPED | OUTPATIENT
Start: 2020-02-24 | End: 2020-05-15

## 2020-02-24 NOTE — TELEPHONE ENCOUNTER
"Per Dr Awan: \"Carafate 1 g in 10 mL, 10 mL p.o. twice daily x1 week, number 150 cc no refill\"     Called pt and advised that Dr Awan is recommending a med called Carafate to use until his esophagus can heal. Advised will call this to his [facility] pharmacy. Pt verb understanding.    "

## 2020-02-24 NOTE — TELEPHONE ENCOUNTER
----- Message from Yaakov Cassidy sent at 2/24/2020  9:36 AM EST -----  Regarding: scope results  Contact: [phone]  Pt calling about scope results.

## 2020-02-24 NOTE — TELEPHONE ENCOUNTER
Called pt back. Advised the path results are not back yet, should be back by about Wednesday of this week. Pt verb understanding and states he is having some burning and pressure in his chest. He states it is constant, and not overwhelming but bad enough for him to know it is there. He states it feels like when you are about to get a respiratory infection, but it has not set in yet, if that makes sense? He states it is not worse with swallowing or with taking a deep breath. He states it does get worse when he is out in the cold. Not worse with exertion and he is not coughing anything up. Advised will update Dr Awan and call back. Pt verb understanding.

## 2020-02-26 ENCOUNTER — TELEPHONE (OUTPATIENT)
Dept: GASTROENTEROLOGY | Facility: CLINIC | Age: 45
End: 2020-02-26

## 2020-02-26 NOTE — TELEPHONE ENCOUNTER
----- Message from Grey Awan MD sent at 2/26/2020  2:13 PM EST -----  Call him immediately, he has lymphocytic colitis as a cause for his abdominal pain and diarrhea  He can continue the Elavil at night if it is helping him sleep, but he does not really need it now for any irritable bowel  Please call in Entocort 3 mg tablets, 3 tablets p.o. every morning, #90, 2 refills  Office visit Deidre to check symptoms for 2 to 4 weeks and to talk about tapering the steroid  He can read about microscopic colitis at the [facility] website  His Willoughby's esophagus had no dysplasia  Continue twice daily PPI for now  EGD recall 3 years    Please message me back when you have called in the Entocort and also with what date his appointment is

## 2020-02-26 NOTE — TELEPHONE ENCOUNTER
Called pt and advised of the note from Dr Awan. Explained diagnoses (lymphocytic colitis and Willoughby's esophagus) to him and how/why we treat them. He verb understanding and states did not start the Elavil yet, he was waiting for the results to come back. He does not necessarily want to take it though if the budesonide is what will get him feeling better. Can Dr Awan prescribe him something for sleep until he can get established with a PMD? Advised will check with MD. Pt verb understanding.

## 2020-02-26 NOTE — PROGRESS NOTES
Call him immediately, he has lymphocytic colitis as a cause for his abdominal pain and diarrhea  He can continue the Elavil at night if it is helping him sleep, but he does not really need it now for any irritable bowel  Please call in Entocort 3 mg tablets, 3 tablets p.o. every morning, #90, 2 refills  Office visit Deidre to check symptoms for 2 to 4 weeks and to talk about tapering the steroid  He can read about microscopic colitis at the [facility] website  His Willoughby's esophagus had no dysplasia  Continue twice daily PPI for now  EGD recall 3 years    Please message me back when you have called in the Entocort and also with what date his appointment is

## 2020-02-26 NOTE — TELEPHONE ENCOUNTER
----- Message from Suleiman Aguilar sent at 2/26/2020  2:09 PM EST -----  Regarding: PATH REPORT   Contact: [phone]  CALLING FOR SCOPE REPORT

## 2020-02-28 ENCOUNTER — TELEPHONE (OUTPATIENT)
Dept: GASTROENTEROLOGY | Facility: CLINIC | Age: 45
End: 2020-02-28

## 2020-02-28 RX ORDER — BUDESONIDE 3 MG/1
9 CAPSULE, COATED PELLETS ORAL EVERY MORNING
Qty: 90 CAPSULE | Refills: 2 | Status: SHIPPED | OUTPATIENT
Start: 2020-02-28 | End: 2021-06-17

## 2020-02-28 NOTE — TELEPHONE ENCOUNTER
----- Message from Yaakov Roy sent at 2/28/2020 11:22 AM EST -----  Regarding: Prescription   Contact: [phone]  Was suppose to have an Antibiotic called as well as something to help with sleep. Went to pharmacy and there were no prescriptions, calling to check up on this     Wife Number: [phone]

## 2020-02-28 NOTE — TELEPHONE ENCOUNTER
"Per Dr Awan: \"The current laws do not allow me to prescribe something just for sleep  has to come from his primary care doctor, over-the-counter melatonin or Tylenol PM would be a good solution in the interim\"     Called pt and advised of the note from Dr Awan. He verb understanding.   "

## 2020-05-12 ENCOUNTER — TELEPHONE (OUTPATIENT)
Dept: GASTROENTEROLOGY | Facility: CLINIC | Age: 45
End: 2020-05-12

## 2020-05-12 NOTE — TELEPHONE ENCOUNTER
----- Message from Yaakov Roy sent at 5/12/2020  1:43 PM EDT -----  Regarding: Questions/Conerns   Contact: [phone]  Pt wife, Barbara, called on husbands behalf. States he was never able to get medication, Budesonide, due to expense ($585). She states she and the pharmacy reached out to the office (left a voicemail) and never heard back from the office therefore never received the medication. Pt was due for a follow up with Deidre 3-4 wks after scope (2/19), but they put it off due to Covid-19. She is now wondering if he needs the follow-up at this time since he was never able to take the medication. She would also like to know if he still needs this medication and if so can we prescribe something different that doesn't cost so much?     #please call wife back, she will be on a work call starting at 3    Barbara  [phone]

## 2020-05-12 NOTE — TELEPHONE ENCOUNTER
Called [facility]. Spoke with Emily Castillo. She states the patient's oop cost is $595. She states the insurance did cover the medication but this was what was leftover. She states the wife called today asking for an alternative for the med. She states she advised her to call their insurance.

## 2020-05-13 NOTE — TELEPHONE ENCOUNTER
Val Mckeon Shelley D, RN   Phone Number: [phone]             Pt's wife Barbara is retuning your call

## 2020-05-14 NOTE — TELEPHONE ENCOUNTER
Returned wife's call and she reports that her  is still having both upper and lower gi issues.  Offered appt and appt was made for tomorrow at 115p with Deidre MATTHEWS>

## 2020-05-15 ENCOUNTER — OFFICE VISIT (OUTPATIENT)
Dept: GASTROENTEROLOGY | Facility: CLINIC | Age: 45
End: 2020-05-15

## 2020-05-15 VITALS — TEMPERATURE: 96.1 F | WEIGHT: 190 LBS | BODY MASS INDEX: 26.6 KG/M2 | HEIGHT: 71 IN

## 2020-05-15 DIAGNOSIS — K21.9 GASTROESOPHAGEAL REFLUX DISEASE WITHOUT ESOPHAGITIS: ICD-10-CM

## 2020-05-15 DIAGNOSIS — K22.70 BARRETT'S ESOPHAGUS WITHOUT DYSPLASIA: ICD-10-CM

## 2020-05-15 DIAGNOSIS — R13.10 ODYNOPHAGIA: Primary | ICD-10-CM

## 2020-05-15 DIAGNOSIS — K52.832 LYMPHOCYTIC COLITIS: ICD-10-CM

## 2020-05-15 PROCEDURE — 99214 OFFICE O/P EST MOD 30 MIN: CPT | Performed by: NURSE PRACTITIONER

## 2020-05-15 RX ORDER — SUCRALFATE ORAL 1 G/10ML
1 SUSPENSION ORAL 2 TIMES DAILY
Qty: 420 ML | Refills: 2 | Status: SHIPPED | OUTPATIENT
Start: 2020-05-15 | End: 2020-10-25

## 2020-05-15 RX ORDER — ESOMEPRAZOLE MAGNESIUM 40 MG/1
40 CAPSULE, DELAYED RELEASE ORAL 2 TIMES DAILY
Qty: 60 CAPSULE | Refills: 5 | Status: SHIPPED | OUTPATIENT
Start: 2020-05-15 | End: 2023-01-13 | Stop reason: SDUPTHER

## 2020-05-15 NOTE — PROGRESS NOTES
Chief Complaint   Patient presents with   • Follow-up   • Irritable Bowel Syndrome   • Heartburn     HPI    Raimundo Allison is a  45 y.o. male here for a follow up visit for dyspepsia, odynophagia, and diarrhea.  This patient follows with Dr. Awan known to me.  He also has a history of irritable bowel syndrome responded well to Levsin and Xifaxan in the past.  He recently underwent bidirectional endoscopic evaluation for symptoms.  He was also due for evaluation given personal history of colon polyps.    I reviewed procedural findings dated 2/19/2020 as follows:    EGD: 1 cm hiatal hernia, long segment Willoughby's esophagus, otherwise normal.  Colonoscopy: Tattoo seen in the sigmoid at post polypectomy scar.  Internal hemorrhoids.  Normal ileum.  Recall 5 years.    Pathology was positive for lymphocytic colitis and patient was started on Entocort.  He was also recommended to continue Elavil if it was helping.  He was also found to have Willoughby's esophagus without dysplasia.  Repeat EGD 3 years, continue twice daily dosing PPI therapy for now.    Follow-up visit was prolonged due to COVID-19 pandemic.  Today he admits he never started treatment for lymphocytic colitis due to an affordability however he is transition his prescriptions to [facility] and plans to  budesonide today.  He still struggles with loose stools and abdominal cramping.  He does report improvement in abdominal cramping on Elavil which she is taking nightly.    He still complains of odynophagia.  He would like to transition from Protonix to Nexium which is helped the most in the past.  He took a 2 to 3-week course of Carafate post procedure which helped tremendously.  He would like to restart Carafate at a longer duration.  No nausea, vomiting, or dysphagia.  His appetite is good.  His weight is stable.  He does admit that he frequently consumes alcohol and eats acidic foods which are known to trigger GERD.    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)    • Bronchitis    • Chest pain    • Costochondritis    • Factor 5 Leiden mutation, heterozygous (CMS/HCC)    • Family history of heart disease 10/13/2017    Father   • GERD (gastroesophageal reflux disease)    • GERD (gastroesophageal reflux disease)    • High cholesterol    • Hyperlipidemia    • Irritable bowel syndrome        Past Surgical History:   Procedure Laterality Date   • COLONOSCOPY N/A 9/23/2016    TVA w/low grade dysplasia, focal ulceration   • SHOULDER ACROMIOPLASTY Bilateral    • SHOULDER ARTHROSCOPY  2015    ALISON Holland MD       Scheduled Meds:  Outpatient Encounter Medications as of 5/15/2020   Medication Sig Dispense Refill   • amitriptyline (ELAVIL) 25 MG tablet Take 1 tablet by mouth Every Night. 30 tablet 2   • calcium polycarbophil (FIBERCON) 625 MG tablet Take 625 mg by mouth Daily.     • [DISCONTINUED] pantoprazole (PROTONIX) 40 MG EC tablet Take 1 tablet by mouth 2 (Two) Times a Day. 180 tablet 11   • atorvastatin (LIPITOR) 10 MG tablet TAKE 1 TABLET DAILY 90 tablet 0   • Budesonide (ENTOCORT EC) 3 MG 24 hr capsule Take 3 capsules by mouth Every Morning. 90 capsule 2   • esomeprazole (nexIUM) 40 MG capsule Take 1 capsule by mouth 2 (Two) Times a Day. 60 capsule 5   • sucralfate (Carafate) 1 GM/10ML suspension Take 10 mL by mouth 2 (Two) Times a Day. 420 mL 2   • [DISCONTINUED] sucralfate (CARAFATE) 1 GM/10ML suspension Take 10 mL by mouth 2 (Two) Times a Day Before Meals. 150 mL 0     No facility-administered encounter medications on file as of 5/15/2020.        Continuous Infusions:  No current facility-administered medications for this visit.     PRN Meds:.    No Known Allergies    Social History     Socioeconomic History   • Marital status:      Spouse name: Not on file   • Number of children: Not on file   • Years of education: Not on file   • Highest education level: Not on file   Tobacco Use   • Smoking status: Former  Smoker     Types: Cigars, Cigarettes     Last attempt to quit: 2016     Years since quittin.3   • Smokeless tobacco: Never Used   Substance and Sexual Activity   • Alcohol use: Yes     Alcohol/week: 16.0 standard drinks     Types: 4 Cans of beer, 12 Standard drinks or equivalent per week     Comment: daily   • Drug use: No       Family History   Problem Relation Age of Onset   • Colon polyps Mother    • Factor V Leiden deficiency Father    • Heart attack Father    • Factor V Leiden deficiency Sister    • Factor V Leiden deficiency Brother    • Colon cancer Maternal Uncle         late 50s       Review of Systems   Constitutional: Negative for activity change, appetite change, fatigue, fever and unexpected weight change.   HENT: Negative for trouble swallowing.         Odynophagia   Respiratory: Negative for apnea, cough, choking, chest tightness, shortness of breath and wheezing.    Cardiovascular: Negative for chest pain, palpitations and leg swelling.   Gastrointestinal: Positive for diarrhea. Negative for abdominal distention, abdominal pain, anal bleeding, blood in stool, constipation, nausea, rectal pain and vomiting.       Vitals:    05/15/20 1320   Temp: 96.1 °F (35.6 °C)       Physical Exam   Constitutional: He is oriented to person, place, and time. He appears well-developed and well-nourished.   Eyes: Pupils are equal, round, and reactive to light.   Cardiovascular: Normal rate, regular rhythm and normal heart sounds.   Pulmonary/Chest: Effort normal and breath sounds normal. No respiratory distress. He has no wheezes.   Abdominal: Soft. Bowel sounds are normal. He exhibits no distension and no mass. There is no tenderness. There is no guarding. No hernia.   Musculoskeletal: Normal range of motion.   Neurological: He is alert and oriented to person, place, and time.   Skin: Skin is warm and dry. Capillary refill takes less than 2 seconds.   Psychiatric: He has a normal mood and affect. His behavior is  normal.       No radiology results for the last 7 days    Raimundo was seen today for follow-up, irritable bowel syndrome and heartburn.    Diagnoses and all orders for this visit:    Odynophagia    Gastroesophageal reflux disease without esophagitis    Willoughby's esophagus without dysplasia    Lymphocytic colitis    Other orders  -     esomeprazole (nexIUM) 40 MG capsule; Take 1 capsule by mouth 2 (Two) Times a Day.  -     sucralfate (Carafate) 1 GM/10ML suspension; Take 10 mL by mouth 2 (Two) Times a Day.      Impression:    Pleasant 45-year-old male seen today in follow-up.  We reviewed recent endoscopic evaluation as well as pathology and recommendations for follow-up.  Patient was he never started budesonide to treat lymphocytic colitis due to an affordability but he plans to pick this up today as it will be cheaper with [employer].  Recommend he go ahead and start budesonide as prescribed.  Transition to Nexium twice daily dosing to treat residual odynophagia.  Restart Carafate suspension twice daily as this is helped in the past with 6-week course.  Antireflux dietary precautions reviewed with the patient.  Return to clinic 4 weeks to monitor for symptom improvement.

## 2020-10-25 RX ORDER — SUCRALFATE ORAL 1 G/10ML
SUSPENSION ORAL
Qty: 420 ML | Refills: 1 | Status: SHIPPED | OUTPATIENT
Start: 2020-10-25 | End: 2021-06-17

## 2021-01-07 ENCOUNTER — HOSPITAL ENCOUNTER (OUTPATIENT)
Dept: CT IMAGING | Facility: HOSPITAL | Age: 46
Discharge: HOME | End: 2021-01-07
Admitting: INTERNAL MEDICINE

## 2021-01-07 ENCOUNTER — TRANSCRIBE ORDERS (OUTPATIENT)
Dept: ADMINISTRATIVE | Facility: HOSPITAL | Age: 46
End: 2021-01-07

## 2021-01-07 DIAGNOSIS — R10.9 RIGHT SIDED ABDOMINAL PAIN: ICD-10-CM

## 2021-01-07 DIAGNOSIS — R10.9 RIGHT SIDED ABDOMINAL PAIN: Primary | ICD-10-CM

## 2021-01-07 PROCEDURE — 74177 CT ABD & PELVIS W/CONTRAST: CPT

## 2021-01-07 PROCEDURE — 25010000002 IOPAMIDOL 61 % SOLUTION: Performed by: INTERNAL MEDICINE

## 2021-01-07 PROCEDURE — 0 DIATRIZOATE MEGLUMINE & SODIUM PER 1 ML: Performed by: INTERNAL MEDICINE

## 2021-01-07 RX ADMIN — IOPAMIDOL 85 ML: 612 INJECTION, SOLUTION INTRAVENOUS at 15:59

## 2021-01-07 RX ADMIN — DIATRIZOATE MEGLUMINE AND DIATRIZOATE SODIUM 30 ML: 660; 100 LIQUID ORAL; RECTAL at 15:00

## 2021-01-08 ENCOUNTER — TELEPHONE (OUTPATIENT)
Dept: GASTROENTEROLOGY | Facility: CLINIC | Age: 46
End: 2021-01-08

## 2021-01-08 NOTE — TELEPHONE ENCOUNTER
----- Message from Haily Sanchez sent at 1/8/2021 11:13 AM EST -----  Contact: [phone]  I received this message from the [facility].  Can you please take care of this today?    Sotero Yuan!  Happy new year!  Independent physician,  Dr. Ritchie Larkin reached out on behalf of his  patient, Kennedy Allison([birth year]).    Mr. Allison said that he's been waiting   several days for a return call from Dr. Awan.  Apparently he's left multiple messages.  Can you help?  Thanks Lissy- always appreciated.    Thanks!

## 2021-01-08 NOTE — TELEPHONE ENCOUNTER
Called pt, he states he tried several times to call to make an appt but no one answered the phone (?).   Pt states he is having RLQ pain.. Denies fever, N/V.  States he has a CT yesterday ordered by PCP and it did not show appendicitis or colitis. Pt states he has hx of IBS.     Made appt to see Deidre MATTHEWS on 02/10/21 @ 11am.     Advised pt if started to feel worse to call back or go to ER.

## 2021-03-02 ENCOUNTER — TELEPHONE (OUTPATIENT)
Dept: GASTROENTEROLOGY | Facility: CLINIC | Age: 46
End: 2021-03-02

## 2021-03-02 ENCOUNTER — OFFICE VISIT (OUTPATIENT)
Dept: GASTROENTEROLOGY | Facility: CLINIC | Age: 46
End: 2021-03-02

## 2021-03-02 VITALS — HEIGHT: 71 IN | BODY MASS INDEX: 25.56 KG/M2 | TEMPERATURE: 98 F | WEIGHT: 182.6 LBS

## 2021-03-02 DIAGNOSIS — R19.7 DIARRHEA, UNSPECIFIED TYPE: Primary | ICD-10-CM

## 2021-03-02 DIAGNOSIS — R10.84 GENERALIZED ABDOMINAL CRAMPING: ICD-10-CM

## 2021-03-02 PROCEDURE — 99214 OFFICE O/P EST MOD 30 MIN: CPT | Performed by: NURSE PRACTITIONER

## 2021-03-02 RX ORDER — DIAZEPAM 5 MG/1
1 TABLET ORAL DAILY PRN
COMMUNITY
Start: 2020-12-21

## 2021-03-02 NOTE — PROGRESS NOTES
Chief Complaint   Patient presents with   • Irritable bowel syndrome diarrhea predominant     HPI    Raimundo Allison is a  46 y.o. male here for a follow up visit for right lower quadrant abdominal pain and irritable bowel syndrome.    This patient follows with myself and Dr. Awan.  He has past medical history of irritable bowel syndrome responded well to Levsin and Xifaxan.  He had an EGD and a colonoscopy in February 2020 demonstrating long segment Willoughby's esophagus and post polypectomy scar.  Follow-up 5 years.  Pathology was positive for lymphocytic colitis and he was started on Entocort.    More recently he developed right lower quadrant abdominal pain subsequently completely CT A/P with contrast which was unremarkable.    On visit today he reports increase in IBS type symptoms which include diarrhea, gas and bloating, and lower abdominal cramps.  Symptoms mainly postprandial in the morning but can occur intermittently throughout the day.  Some improvement with Bentyl.  No relief with IBgard.  He tried Elavil in the past with no benefit.  He was on Entocort following colonoscopy last year which we tapered him off of but he cannot recall if this helped or not.  He is very vague about his response to medications in the past.  He is taking FiberCon every other day.  He does report a great deal of anxiety and stress with his youngest daughters softball schedule.  He is contemplated smoking again.  He was started on a trial of antianxiety medicine by his PCP Dr. Larkin which seems to be helping.    No nausea, vomiting, acid reflux/heartburn, or dysphagia.  He reports adequate control of dyspeptic symptoms with Nexium 40 mg p.o. daily.  He sleeps with his head of the bed slightly elevated.  He is due for an EGD for Willoughby's esophagus surveillance in 2023.    He is due for colonoscopy in 2025.  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)    • Bronchitis    • Chest  pain    • Costochondritis    • Factor 5 Leiden mutation, heterozygous (CMS/HCC)    • Family history of heart disease 10/13/2017    Father   • GERD (gastroesophageal reflux disease)    • GERD (gastroesophageal reflux disease)    • High cholesterol    • Hyperlipidemia    • Irritable bowel syndrome        Past Surgical History:   Procedure Laterality Date   • COLONOSCOPY N/A 2016    TVA w/low grade dysplasia, focal ulceration   • SHOULDER ACROMIOPLASTY Bilateral    • SHOULDER ARTHROSCOPY      ALISON Holland MD       Scheduled Meds:  Outpatient Encounter Medications as of 3/2/2021   Medication Sig Dispense Refill   • calcium polycarbophil (FIBERCON) 625 MG tablet Take 625 mg by mouth Daily.     • diazePAM (VALIUM) 5 MG tablet Take 1 tablet by mouth Daily As Needed.     • esomeprazole (nexIUM) 40 MG capsule Take 1 capsule by mouth 2 (Two) Times a Day. 60 capsule 5   • hyoscyamine (LEVSIN) 0.125 MG SL tablet Take 0.125 mg by mouth Every 4 (Four) Hours As Needed for Cramping.     • sucralfate (CARAFATE) 1 GM/10ML suspension TAKE 10 MLS BY MOUTH TWICE A  mL 1   • amitriptyline (ELAVIL) 25 MG tablet Take 1 tablet by mouth Every Night. 30 tablet 2   • atorvastatin (LIPITOR) 10 MG tablet TAKE 1 TABLET DAILY 90 tablet 0   • Budesonide (ENTOCORT EC) 3 MG 24 hr capsule Take 3 capsules by mouth Every Morning. 90 capsule 2     No facility-administered encounter medications on file as of 3/2/2021.        Continuous Infusions:No current facility-administered medications for this visit.       PRN Meds:.    No Known Allergies    Social History     Socioeconomic History   • Marital status:      Spouse name: Not on file   • Number of children: Not on file   • Years of education: Not on file   • Highest education level: Not on file   Tobacco Use   • Smoking status: Former Smoker     Types: Cigars, Cigarettes     Quit date: 2016     Years since quittin.1   • Smokeless tobacco: Never Used   Substance and Sexual  Activity   • Alcohol use: Yes     Alcohol/week: 16.0 standard drinks     Types: 4 Cans of beer, 12 Standard drinks or equivalent per week     Comment: daily   • Drug use: No       Family History   Problem Relation Age of Onset   • Colon polyps Mother    • Factor V Leiden deficiency Father    • Heart attack Father    • Factor V Leiden deficiency Sister    • Factor V Leiden deficiency Brother    • Colon cancer Maternal Uncle         late 50s       Review of Systems   Constitutional: Negative for activity change, appetite change, fatigue, fever and unexpected weight change.   HENT: Negative for trouble swallowing.    Respiratory: Negative for apnea, cough, choking, chest tightness, shortness of breath and wheezing.    Cardiovascular: Negative for chest pain, palpitations and leg swelling.   Gastrointestinal: Positive for diarrhea. Negative for abdominal distention, abdominal pain, anal bleeding, blood in stool, constipation, nausea, rectal pain and vomiting.        + Gas and bloating with abdominal cramps       Vitals:    03/02/21 1345   Temp: 98 °F (36.7 °C)       Physical Exam  Constitutional:       Appearance: He is well-developed.   Eyes:      Pupils: Pupils are equal, round, and reactive to light.   Cardiovascular:      Rate and Rhythm: Normal rate and regular rhythm.      Heart sounds: Normal heart sounds.   Pulmonary:      Effort: Pulmonary effort is normal. No respiratory distress.      Breath sounds: Normal breath sounds. No wheezing.   Abdominal:      General: Bowel sounds are normal. There is no distension.      Palpations: Abdomen is soft. There is no mass.      Tenderness: There is no abdominal tenderness. There is no guarding.      Hernia: No hernia is present.   Skin:     General: Skin is warm and dry.   Neurological:      Mental Status: He is alert and oriented to person, place, and time.   Psychiatric:         Behavior: Behavior normal.       Assessment    Lower abdominal cramps  Diarrhea  Gas and  bloating  GERD  History of lymphocytic colitis  Irritable bowel syndrome diarrhea predominant    Plan    Pleasant 46-year-old male seen today in follow-up with worsening symptoms of lower abdominal cramps, diarrhea, gas and bloat.  Recent CT scan reassuring no acute process noted.  He does report a great deal of stress as mentioned above in has had some reduction in symptoms with recent anxiety medication.  Given his history of irritable bowel syndrome and lymphocytic colitis recommend stool testing to rule out any sort of infectious pathogen and assess for colonic inflammation before we determine whether or not we resume budesonide versus trial of Xifaxan.  Update labs to include CBC, CMP, TSH, CRP, and sed rate.    He does still have his gallbladder.  I wonder if he could be having issues with biliary dyskinesia as such we will arrange HIDA scan.    In the meanwhile continue to work on stress reduction, continue as needed antispasmodic Bentyl, continue fiber supplement.    Stable GERD, continue Nexium.    EGD in 2023 for Willoughby surveillance.  Colonoscopy in 2025 for screening purposes.    Further recommendations and follow-up pending the aforementioned work-up.    Addendum: Patient left without getting labs drawn or receiving stool kits.  We will attempt to call the patient.

## 2021-03-02 NOTE — TELEPHONE ENCOUNTER
Called pt and advised that there is a stool kit for him at the .  Also, advised he could go to [employer] and pick one up since he lives in [place].  Pt verbalized understanding.

## 2021-03-02 NOTE — TELEPHONE ENCOUNTER
----- Message from Yaakov Cervantes Rep sent at 3/2/2021  4:09 PM EST -----  Regarding: Call back  Contact: [phone]  PT is calling back to discuss a test recommended by annie, please call

## 2021-03-23 ENCOUNTER — TELEPHONE (OUTPATIENT)
Dept: GASTROENTEROLOGY | Facility: CLINIC | Age: 46
End: 2021-03-23

## 2021-03-23 NOTE — TELEPHONE ENCOUNTER
----- Message from Yaakov Downs Rep sent at 3/23/2021 12:51 PM EDT -----  Regarding: boost  [employer] is on back order. Is Boost an acceptable alternative. Please advise Nuclear Medicine [phone]. Thank you

## 2021-03-24 ENCOUNTER — HOSPITAL ENCOUNTER (OUTPATIENT)
Dept: NUCLEAR MEDICINE | Facility: HOSPITAL | Age: 46
End: 2021-03-24

## 2021-04-16 ENCOUNTER — APPOINTMENT (OUTPATIENT)
Dept: NUCLEAR MEDICINE | Facility: HOSPITAL | Age: 46
End: 2021-04-16

## 2021-04-21 ENCOUNTER — HOSPITAL ENCOUNTER (OUTPATIENT)
Dept: NUCLEAR MEDICINE | Facility: HOSPITAL | Age: 46
Discharge: HOME | End: 2021-04-21

## 2021-04-21 DIAGNOSIS — R10.84 GENERALIZED ABDOMINAL CRAMPING: ICD-10-CM

## 2021-04-21 DIAGNOSIS — R19.7 DIARRHEA, UNSPECIFIED TYPE: ICD-10-CM

## 2021-04-21 PROCEDURE — A9537 TC99M MEBROFENIN: HCPCS | Performed by: NURSE PRACTITIONER

## 2021-04-21 PROCEDURE — 0 TECHNETIUM TC 99M MEBROFENIN KIT: Performed by: NURSE PRACTITIONER

## 2021-04-21 PROCEDURE — 78226 HEPATOBILIARY SYSTEM IMAGING: CPT

## 2021-04-21 RX ORDER — KIT FOR THE PREPARATION OF TECHNETIUM TC 99M MEBROFENIN 45 MG/10ML
1 INJECTION, POWDER, LYOPHILIZED, FOR SOLUTION INTRAVENOUS
Status: COMPLETED | OUTPATIENT
Start: 2021-04-21 | End: 2021-04-21

## 2021-04-21 RX ADMIN — MEBROFENIN 1 DOSE: 45 INJECTION, POWDER, LYOPHILIZED, FOR SOLUTION INTRAVENOUS at 11:05

## 2021-04-22 DIAGNOSIS — K82.8 BILIARY DYSKINESIA: Primary | ICD-10-CM

## 2021-04-22 DIAGNOSIS — R19.7 DIARRHEA, UNSPECIFIED TYPE: ICD-10-CM

## 2021-04-22 DIAGNOSIS — R94.8 ABNORMAL BILIARY HIDA SCAN: ICD-10-CM

## 2021-04-22 NOTE — PROGRESS NOTES
Please call the patient and let him know that his HIDA scan is abnormal.  His gallbladder is not working correctly.  I want him to go see Dr. Bianca Rubalcava to discuss further to see if he would benefit from having his gallbladder removed.  Low-fat diet in the interim.

## 2021-05-12 ENCOUNTER — TELEPHONE (OUTPATIENT)
Dept: GASTROENTEROLOGY | Facility: CLINIC | Age: 46
End: 2021-05-12

## 2021-05-12 NOTE — TELEPHONE ENCOUNTER
----- Message from WING Motley sent at 4/22/2021  2:09 PM EDT -----  Please call the patient and let him know that his HIDA scan is abnormal.  His gallbladder is not working correctly.  I want him to go see Dr. Bianca Rubalcava to discuss further to see if he would benefit from having his gallbladder removed.  Low-fat diet in the interim.

## 2021-06-17 ENCOUNTER — OFFICE VISIT (OUTPATIENT)
Dept: GASTROENTEROLOGY | Facility: CLINIC | Age: 46
End: 2021-06-17

## 2021-06-17 VITALS — BODY MASS INDEX: 24.81 KG/M2 | WEIGHT: 177.2 LBS | HEIGHT: 71 IN

## 2021-06-17 DIAGNOSIS — R94.8 ABNORMAL BILIARY HIDA SCAN: Primary | ICD-10-CM

## 2021-06-17 DIAGNOSIS — R10.11 RIGHT UPPER QUADRANT ABDOMINAL PAIN: ICD-10-CM

## 2021-06-17 DIAGNOSIS — K52.9 POSTPRANDIAL DIARRHEA: ICD-10-CM

## 2021-06-17 PROCEDURE — 99214 OFFICE O/P EST MOD 30 MIN: CPT | Performed by: NURSE PRACTITIONER

## 2021-06-17 NOTE — PROGRESS NOTES
Chief Complaint   Patient presents with   • Follow-up     Hida Scan      HPI    Raimundo Allison is a  46 y.o. male here for a follow up visit for abnormal HIDA scan.    This patient follows with myself and Dr. Awan.    He has had an increase in IBS type symptoms over the last 8 months with development of right-sided abdominal pain.  Recent HIDA scan with an ejection fraction of 30%.  We have recommended he see surgical services to discuss cholecystectomy and is here to discuss further.    He still having intermittent right-sided abdominal pain.  He is following a low-fat diet.  He feels like dyspeptic symptoms are well controlled on once daily Nexium.  He continues on fiber supplementation and nightly Elavil.  No nausea or vomiting currently.  Occasional postprandial diarrhea which is been ongoing since his right-sided abdominal pain started.  No rectal bleeding.    EGD and a colonoscopy in February 2020 demonstrating long segment Willoughby's esophagus and post polypectomy scar.  Follow-up 5 years.  Pathology was positive for lymphocytic colitis and he was started on Entocort.    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)    • Bronchitis    • Chest pain    • Costochondritis    • Factor 5 Leiden mutation, heterozygous (CMS/HCC)    • Family history of heart disease 10/13/2017    Father   • GERD (gastroesophageal reflux disease)    • GERD (gastroesophageal reflux disease)    • High cholesterol    • Hyperlipidemia    • Irritable bowel syndrome        Past Surgical History:   Procedure Laterality Date   • COLONOSCOPY N/A 9/23/2016    TVA w/low grade dysplasia, focal ulceration   • SHOULDER ACROMIOPLASTY Bilateral    • SHOULDER ARTHROSCOPY  2015    ALISON Holland MD       Scheduled Meds:  Outpatient Encounter Medications as of 6/17/2021   Medication Sig Dispense Refill   • amitriptyline (ELAVIL) 25 MG tablet Take 1 tablet by mouth Every Night. 30 tablet 2   • atorvastatin  (LIPITOR) 10 MG tablet TAKE 1 TABLET DAILY 90 tablet 0   • Budesonide (ENTOCORT EC) 3 MG 24 hr capsule Take 3 capsules by mouth Every Morning. 90 capsule 2   • calcium polycarbophil (FIBERCON) 625 MG tablet Take 625 mg by mouth Daily.     • diazePAM (VALIUM) 5 MG tablet Take 1 tablet by mouth Daily As Needed.     • esomeprazole (nexIUM) 40 MG capsule Take 1 capsule by mouth 2 (Two) Times a Day. 60 capsule 5   • hyoscyamine (LEVSIN) 0.125 MG SL tablet Take 0.125 mg by mouth Every 4 (Four) Hours As Needed for Cramping.     • sucralfate (CARAFATE) 1 GM/10ML suspension TAKE 10 MLS BY MOUTH TWICE A  mL 1     No facility-administered encounter medications on file as of 2021.       Continuous Infusions:No current facility-administered medications for this visit.      PRN Meds:.    No Known Allergies    Social History     Socioeconomic History   • Marital status:      Spouse name: Not on file   • Number of children: Not on file   • Years of education: Not on file   • Highest education level: Not on file   Tobacco Use   • Smoking status: Former Smoker     Types: Cigars, Cigarettes     Quit date:      Years since quittin.4   • Smokeless tobacco: Never Used   Substance and Sexual Activity   • Alcohol use: Yes     Alcohol/week: 16.0 standard drinks     Types: 4 Cans of beer, 12 Standard drinks or equivalent per week     Comment: daily   • Drug use: No       Family History   Problem Relation Age of Onset   • Colon polyps Mother    • Factor V Leiden deficiency Father    • Heart attack Father    • Factor V Leiden deficiency Sister    • Factor V Leiden deficiency Brother    • Colon cancer Maternal Uncle         late 50s       Review of Systems   Constitutional: Negative for activity change, appetite change, fatigue, fever and unexpected weight change.   HENT: Negative for trouble swallowing.    Respiratory: Negative for apnea, cough, choking, chest tightness, shortness of breath and wheezing.     Cardiovascular: Negative for chest pain, palpitations and leg swelling.   Gastrointestinal: Positive for abdominal pain and diarrhea. Negative for abdominal distention, anal bleeding, blood in stool, constipation, nausea, rectal pain and vomiting.       There were no vitals filed for this visit.    Physical Exam  Constitutional:       Appearance: He is well-developed.   Abdominal:      General: Bowel sounds are normal. There is no distension.      Palpations: Abdomen is soft. There is no mass.      Tenderness: There is no abdominal tenderness. There is no guarding.      Hernia: No hernia is present.   Musculoskeletal:         General: Normal range of motion.   Skin:     General: Skin is warm and dry.      Capillary Refill: Capillary refill takes less than 2 seconds.   Neurological:      Mental Status: He is alert and oriented to person, place, and time.   Psychiatric:         Behavior: Behavior normal.     Assessment    Abnormal HIDA scan  Right upper quadrant abdominal pain  Postprandial diarrhea  Irritable bowel syndrome  GERD  Willoughby's esophagus  Lymphocytic colitis in remission    Plan    Recommend referral to Dr. Bianca Rubalcava to discuss possibility of cholecystectomy.  Continue low-fat diet in the interim.  Continue PPI therapy.  Continue nightly Elavil.  Follow-up and further recommendations pending surgical services evaluation of the patient.

## 2021-08-17 ENCOUNTER — TELEPHONE (OUTPATIENT)
Dept: GASTROENTEROLOGY | Facility: CLINIC | Age: 46
End: 2021-08-17

## 2021-08-17 NOTE — TELEPHONE ENCOUNTER
Called pt, he states no one had called him from the surgery referral we put in.  However, it looks like they had tried.  I gave pt phone of of surgeons office to call and make appt. Pt verbalized understanding.

## 2021-08-17 NOTE — TELEPHONE ENCOUNTER
----- Message from Yaakov Yepez sent at 8/17/2021  3:53 PM EDT -----  Regarding: referral  Contact: [phone]  Spoke with pt and he stated that NP is supposed to send a referral to a Gallbladder surgeon and it has not been sent yet. Can someone please give him a call

## 2021-09-13 NOTE — PROGRESS NOTES
General Surgery History and Physical      Summary:    Mr. Raimundo Allison is a 46 y.o. gentleman with biliary dyskinesia.  Discussed robotic cholecystectomy, the indication, the procedure, and the risks and benefits.  Discussed I was not sure this would alleviate all his gastrointestinal symptoms.  He would like to think about if he would like to proceed.  He will call if he would like to schedule.    Referring Provider: WING Motley    Chief Complaint:    Biliary dyskinesia    History of Present Illness:    Mr. Raimundo Allison is a 46 y.o. gentleman who presents with abdominal pain.  He states he had GI complaints since his teenage years.  Has been diagnosed with IBS.  He states he is recently developed right-sided abdominal pain mainly in the lower quadrant.  He states he believes this is worse after he eats.  He also complains of loose stool and nausea.  He complains most of urgency and says he spends several hours a day in the bathroom because he feels like he needs to have a bowel movement.  He states he eats a very healthy diet.    Past Medical History:   • Factor V Leiden mutation, not on anticoagulation  • GERD  • Hyperlipidemia    Past Surgical History:    • Bilateral shoulder acromioplasty    Family History:    • Colon cancer in a maternal uncle in his 50s    Social History:    • Denies tobacco use  • Occasional alcohol use    Allergies:   No Known Allergies    Medications:     Current Outpatient Medications:   •  amitriptyline (ELAVIL) 25 MG tablet, Take 1 tablet by mouth Every Night., Disp: 30 tablet, Rfl: 2  •  atorvastatin (LIPITOR) 10 MG tablet, TAKE 1 TABLET DAILY, Disp: 90 tablet, Rfl: 0  •  calcium polycarbophil (FIBERCON) 625 MG tablet, Take 625 mg by mouth Daily., Disp: , Rfl:   •  diazePAM (VALIUM) 5 MG tablet, Take 1 tablet by mouth Daily As Needed., Disp: , Rfl:   •  esomeprazole (nexIUM) 40 MG capsule, Take 1 capsule by mouth 2 (Two) Times a Day., Disp: 60 capsule, Rfl:  5    Radiology/Endoscopy:    • HIDA scan 4/21/2021: Gallbladder ejection fraction 30%  • CT abdomen pelvis January 2021: No acute abnormality identified  • Last colonoscopy and EGD 2 years ago    Labs:    • Labs from 1/20/2020 reviewed    Review of Systems:   Influenza-like illness: no fever, no  cough, no  sore throat, no  body aches, no loss of sense of taste or smell, no known exposure to person with Covid-19.  Constitutional: Negative for fevers or chills  HENT: Negative for hearing loss or runny nose  Eyes: Negative for vision changes or scleral icterus  Respiratory: Negative for cough or shortness of breath  Cardiovascular: Negative for chest pain or heart palpitations  Gastrointestinal: Positive for right-sided abdominal pain, urgency, loose stool; negative for vomiting, constipation, melena, or hematochezia  Genitourinary: Negative for hematuria or dysuria  Musculoskeletal: Negative for joint swelling or gait instability  Neurologic: Negative for tremors or seizures  Psychiatric: Negative for suicidal ideations or depression  All other systems reviewed and negative    Physical Exam:   • Constitutional: Well-developed well-nourished, no acute distress  • Eyes: Conjunctiva normal, sclera nonicteric  • ENMT: Hearing grossly normal, oral mucosa moist  • Neck: Supple, trachea midline  • Respiratory: No increased work of breathing, normal inspiratory effort  • Cardiovascular: Regular rate, no peripheral edema, no jugular venous distention  • Gastrointestinal: Soft, nontender  • Skin:  Warm, dry, no rash on visualized skin surfaces  • Musculoskeletal: Symmetric strength, normal gait  • Psychiatric: Alert and oriented ×3, normal affect       Keely Rubalcava M.D.  General and Endoscopic Surgery  [facility]    [address] [place] [address], [address]  [place], [place], [facility]  P: [phone]  F: [phone]

## 2021-09-14 ENCOUNTER — OFFICE VISIT (OUTPATIENT)
Dept: SURGERY | Facility: CLINIC | Age: 46
End: 2021-09-14

## 2021-09-14 VITALS — HEIGHT: 71 IN | BODY MASS INDEX: 24.86 KG/M2 | WEIGHT: 177.6 LBS

## 2021-09-14 DIAGNOSIS — K82.8 BILIARY DYSKINESIA: Primary | ICD-10-CM

## 2021-09-14 PROCEDURE — 99203 OFFICE O/P NEW LOW 30 MIN: CPT | Performed by: STUDENT IN AN ORGANIZED HEALTH CARE EDUCATION/TRAINING PROGRAM

## 2021-09-16 ENCOUNTER — PATIENT ROUNDING (BHMG ONLY) (OUTPATIENT)
Dept: SURGERY | Facility: CLINIC | Age: 46
End: 2021-09-16

## 2021-09-16 NOTE — PROGRESS NOTES
September 16, 2021    Hello, may I speak with Raimundo Allison?    My name is Cirilo     I am  with [facility]  [address]  [facility] [address] [phone].    Before we get started may I verify your date of birth? 1975    I am calling to officially welcome you to our practice and ask about your recent visit. Is this a good time to talk? yes    Tell me about your visit with us. What things went well?  Everything was fine and quick.        We're always looking for ways to make our patients' experiences even better. Do you have recommendations on ways we may improve?  no    Overall were you satisfied with your first visit to our practice? yes       I appreciate you taking the time to speak with me today. Is there anything else I can do for you? no      Thank you, and have a great day.

## 2021-10-04 ENCOUNTER — TELEPHONE (OUTPATIENT)
Dept: GASTROENTEROLOGY | Facility: CLINIC | Age: 46
End: 2021-10-04

## 2021-10-04 NOTE — TELEPHONE ENCOUNTER
----- Message from WING Motley sent at 9/15/2021  1:16 PM EDT -----  Can we offer this patient a follow-up with Dr. Awan next available.  Does not have to be a work in but just whenever Dr. Awan's next follow-up is.  ----- Message -----  From: Keely Rubalcava MD  Sent: 9/14/2021   5:53 PM EDT  To: WING Motley

## 2021-11-08 ENCOUNTER — OFFICE VISIT (OUTPATIENT)
Dept: GASTROENTEROLOGY | Facility: CLINIC | Age: 46
End: 2021-11-08

## 2021-11-08 VITALS — TEMPERATURE: 96.9 F | BODY MASS INDEX: 26.21 KG/M2 | HEIGHT: 71 IN | WEIGHT: 187.2 LBS

## 2021-11-08 DIAGNOSIS — R94.8 ABNORMAL BILIARY HIDA SCAN: Primary | ICD-10-CM

## 2021-11-08 DIAGNOSIS — K52.9 POSTPRANDIAL DIARRHEA: ICD-10-CM

## 2021-11-08 DIAGNOSIS — R10.11 RIGHT UPPER QUADRANT ABDOMINAL PAIN: ICD-10-CM

## 2021-11-08 DIAGNOSIS — K58.0 IRRITABLE BOWEL SYNDROME WITH DIARRHEA: ICD-10-CM

## 2021-11-08 DIAGNOSIS — K52.832 LYMPHOCYTIC COLITIS: ICD-10-CM

## 2021-11-08 PROCEDURE — 99214 OFFICE O/P EST MOD 30 MIN: CPT | Performed by: INTERNAL MEDICINE

## 2021-11-08 RX ORDER — ROSUVASTATIN CALCIUM 10 MG/1
TABLET, COATED ORAL
COMMUNITY
Start: 2021-10-13

## 2021-11-08 RX ORDER — BUPROPION HYDROCHLORIDE 150 MG/1
TABLET, EXTENDED RELEASE ORAL
COMMUNITY
Start: 2021-10-12 | End: 2023-01-13

## 2021-11-08 NOTE — PROGRESS NOTES
Chief Complaint   Patient presents with   • Biliary Dyskinesia     Subjective     HPI  Raimundo Allison is a 46 y.o. male who presents today for follow up.  We have followed this gentleman for quite some time for a myriad of GI issues  He had an abnormal HIDA scan right upper quadrant pain and biliary dyskinesia.  He has seen Dr. Rubalcava and rightfully so she said that some of his symptoms should improve with cholecystectomy but his other symptoms from lymphocytic colitis, GERD, irritable bowel syndrome will not be improved with cholecystectomy  We have tried Elavil for IBS-D but unfortunately it caused dry mouth so we discontinued it  He has tried Levsin as needed which seems to work well  We have not tried Xifaxan yet  His GERD is well controlled with Nexium 40 mg p.o. every afternoon  He had just surveyed his Willoughby's esophagus long segment there was no dysplasia, I would repeat his EGD in 3 years  Colonoscopy was normal last year I will repeat that on the same day as his EGD  He has no new symptoms still left with diarrhea lower abdominal cramping and right upper quadrant pain  No dysphagia, weight loss or anything concerning with regard to Willoughby's esophagus      Objective   Vitals:    11/08/21 1523   Temp: 96.9 °F (36.1 °C)       Physical Exam  Vitals reviewed.   Constitutional:       Appearance: He is well-developed.   HENT:      Head: Normocephalic and atraumatic.   Abdominal:      General: Bowel sounds are normal. There is no distension.      Palpations: Abdomen is soft. There is no mass.      Tenderness: There is no abdominal tenderness.      Hernia: No hernia is present.   Skin:     General: Skin is warm and dry.   Neurological:      Mental Status: He is alert and oriented to person, place, and time.   Psychiatric:         Behavior: Behavior normal.         Thought Content: Thought content normal.         Judgment: Judgment normal.       The following data was reviewed by: Grey Awan MD on  11/08/2021:        Data reviewed: Radiologic studies CAT scan normal in January, HIDA scan with an ejection fraction of 30% this past summer     Narrative & Impression   CT ABDOMEN AND PELVIS WITH IV CONTRAST     HISTORY: 45-year-old male with right-sided abdominal pain.     TECHNIQUE: Radiation dose reduction techniques were utilized, including  automated exposure control and exposure modulation based on body size.   3 mm images were obtained through the abdomen and pelvis after the  administration of IV contrast. There is no previous CT for comparison.     FINDINGS: The liver and gallbladder appear unremarkable and there is no  biliary dilatation. Splenic size is normal. The pancreas, adrenals, and  both kidneys appear unremarkable other than a subcentimeter left renal  cyst. The stomach contains enteric contrast as well as food debris. No  acute bowel abnormality is seen. There is no significant colonic  diverticulosis. The appendix appears within normal limits. There is no  free fluid or lymphadenopathy. There is a tiny umbilical hernia  containing fat. There are no significant abdominal aortic  atherosclerotic changes. There are no airspace consolidations or  effusions at the visualized lower lung fields.     IMPRESSION:  No acute abnormality is seen. Please follow-up as clinically  indicated.         Assessment/Plan   Assessment:          Abnormal HIDA scan  Right upper quadrant abdominal pain  Postprandial diarrhea  Irritable bowel syndrome  GERD  Willoughby's esophagus  Lymphocytic colitis in remission    Plan:     We will try Xifaxan for 2 weeks at 3 times daily dosing for IBS-D  Discontinue Elavil  Continue Levsin as needed for cramping bloating and diarrhea  No indication for Entocort at this time lymphocytic colitis is in remission  Continue Nexium for GERD  EGD 3 years  Colonoscopy 3 years  Consider gallbladder removal in 2022    I spent 35 minutes caring for Raimundo on this date of service. This time  includes time spent by me in the following activities:preparing for the visit, reviewing tests, obtaining and/or reviewing a separately obtained history, performing a medically appropriate examination and/or evaluation , counseling and educating the patient/family/caregiver, ordering medications, tests, or procedures, referring and communicating with other health care professionals , documenting information in the medical record, independently interpreting results and communicating that information with the patient/family/caregiver and care coordination    Grey Awan MD  [facility]  [address] [place] [address]  Office: [phone]

## 2022-01-10 ENCOUNTER — TELEPHONE (OUTPATIENT)
Dept: GASTROENTEROLOGY | Facility: CLINIC | Age: 47
End: 2022-01-10

## 2022-01-10 NOTE — TELEPHONE ENCOUNTER
Per Dr. Awan: We will keep looking for them and when you have 42 pills let him know.    Patient called. No answer. Left VM. Advised as per Dr. Awan's note. Advised to call back for questions.

## 2022-01-10 NOTE — TELEPHONE ENCOUNTER
----- Message from Yaakov Manuel sent at 1/10/2022  9:27 AM EST -----  Regarding: Regine  Contact: [phone]  Cant afford it.  Is there anyway to get some samples ?

## 2022-05-30 ENCOUNTER — HOSPITAL ENCOUNTER (EMERGENCY)
Facility: HOSPITAL | Age: 47
Discharge: HOME | End: 2022-05-30
Attending: EMERGENCY MEDICINE | Admitting: EMERGENCY MEDICINE

## 2022-05-30 VITALS
HEART RATE: 73 BPM | SYSTOLIC BLOOD PRESSURE: 119 MMHG | DIASTOLIC BLOOD PRESSURE: 79 MMHG | RESPIRATION RATE: 18 BRPM | TEMPERATURE: 98.1 F | WEIGHT: 183 LBS | OXYGEN SATURATION: 96 % | BODY MASS INDEX: 25.62 KG/M2 | HEIGHT: 71 IN

## 2022-05-30 DIAGNOSIS — M79.661 RIGHT CALF PAIN: Primary | ICD-10-CM

## 2022-05-30 DIAGNOSIS — D68.51 FACTOR V LEIDEN: ICD-10-CM

## 2022-05-30 LAB
ALBUMIN SERPL-MCNC: 4.3 G/DL (ref 3.5–5.2)
ALBUMIN/GLOB SERPL: 1.9 G/DL
ALP SERPL-CCNC: 79 U/L (ref 39–117)
ALT SERPL W P-5'-P-CCNC: 25 U/L (ref 1–41)
ANION GAP SERPL CALCULATED.3IONS-SCNC: 10 MMOL/L (ref 5–15)
AST SERPL-CCNC: 24 U/L (ref 1–40)
BASOPHILS # BLD AUTO: 0.06 10*3/MM3 (ref 0–0.2)
BASOPHILS NFR BLD AUTO: 0.8 % (ref 0–1.5)
BILIRUB SERPL-MCNC: 0.7 MG/DL (ref 0–1.2)
BUN SERPL-MCNC: 10 MG/DL (ref 6–20)
BUN/CREAT SERPL: 10.2 (ref 7–25)
CALCIUM SPEC-SCNC: 9.2 MG/DL (ref 8.6–10.5)
CHLORIDE SERPL-SCNC: 105 MMOL/L (ref 98–107)
CO2 SERPL-SCNC: 27 MMOL/L (ref 22–29)
CREAT SERPL-MCNC: 0.98 MG/DL (ref 0.76–1.27)
DEPRECATED RDW RBC AUTO: 40.2 FL (ref 37–54)
EGFRCR SERPLBLD CKD-EPI 2021: 94.5 ML/MIN/1.73
EOSINOPHIL # BLD AUTO: 0.49 10*3/MM3 (ref 0–0.4)
EOSINOPHIL NFR BLD AUTO: 6.8 % (ref 0.3–6.2)
ERYTHROCYTE [DISTWIDTH] IN BLOOD BY AUTOMATED COUNT: 12.3 % (ref 12.3–15.4)
GLOBULIN UR ELPH-MCNC: 2.3 GM/DL
GLUCOSE SERPL-MCNC: 77 MG/DL (ref 65–99)
HCT VFR BLD AUTO: 42.5 % (ref 37.5–51)
HGB BLD-MCNC: 14.5 G/DL (ref 13–17.7)
IMM GRANULOCYTES # BLD AUTO: 0.01 10*3/MM3 (ref 0–0.05)
IMM GRANULOCYTES NFR BLD AUTO: 0.1 % (ref 0–0.5)
LYMPHOCYTES # BLD AUTO: 1.98 10*3/MM3 (ref 0.7–3.1)
LYMPHOCYTES NFR BLD AUTO: 27.4 % (ref 19.6–45.3)
MCH RBC QN AUTO: 31 PG (ref 26.6–33)
MCHC RBC AUTO-ENTMCNC: 34.1 G/DL (ref 31.5–35.7)
MCV RBC AUTO: 91 FL (ref 79–97)
MONOCYTES # BLD AUTO: 0.83 10*3/MM3 (ref 0.1–0.9)
MONOCYTES NFR BLD AUTO: 11.5 % (ref 5–12)
NEUTROPHILS NFR BLD AUTO: 3.85 10*3/MM3 (ref 1.7–7)
NEUTROPHILS NFR BLD AUTO: 53.4 % (ref 42.7–76)
NRBC BLD AUTO-RTO: 0 /100 WBC (ref 0–0.2)
PLATELET # BLD AUTO: 284 10*3/MM3 (ref 140–450)
PMV BLD AUTO: 10.1 FL (ref 6–12)
POTASSIUM SERPL-SCNC: 4.2 MMOL/L (ref 3.5–5.2)
PROT SERPL-MCNC: 6.6 G/DL (ref 6–8.5)
RBC # BLD AUTO: 4.67 10*6/MM3 (ref 4.14–5.8)
SODIUM SERPL-SCNC: 142 MMOL/L (ref 136–145)
WBC NRBC COR # BLD: 7.22 10*3/MM3 (ref 3.4–10.8)

## 2022-05-30 PROCEDURE — 99283 EMERGENCY DEPT VISIT LOW MDM: CPT

## 2022-05-30 PROCEDURE — 80053 COMPREHEN METABOLIC PANEL: CPT | Performed by: EMERGENCY MEDICINE

## 2022-05-30 PROCEDURE — 85025 COMPLETE CBC W/AUTO DIFF WBC: CPT | Performed by: EMERGENCY MEDICINE

## 2022-05-30 RX ADMIN — APIXABAN 10 MG: 5 TABLET, FILM COATED ORAL at 20:14

## 2022-05-31 ENCOUNTER — HOSPITAL ENCOUNTER (OUTPATIENT)
Dept: CARDIOLOGY | Facility: HOSPITAL | Age: 47
Discharge: HOME | End: 2022-05-31
Admitting: EMERGENCY MEDICINE

## 2022-05-31 DIAGNOSIS — M79.661 RIGHT CALF PAIN: ICD-10-CM

## 2022-05-31 LAB
BH CV LOWER VASCULAR LEFT COMMON FEMORAL AUGMENT: NORMAL
BH CV LOWER VASCULAR LEFT COMMON FEMORAL COMPETENT: NORMAL
BH CV LOWER VASCULAR LEFT COMMON FEMORAL COMPRESS: NORMAL
BH CV LOWER VASCULAR LEFT COMMON FEMORAL PHASIC: NORMAL
BH CV LOWER VASCULAR LEFT COMMON FEMORAL SPONT: NORMAL
BH CV LOWER VASCULAR RIGHT COMMON FEMORAL AUGMENT: NORMAL
BH CV LOWER VASCULAR RIGHT COMMON FEMORAL COMPETENT: NORMAL
BH CV LOWER VASCULAR RIGHT COMMON FEMORAL COMPRESS: NORMAL
BH CV LOWER VASCULAR RIGHT COMMON FEMORAL PHASIC: NORMAL
BH CV LOWER VASCULAR RIGHT COMMON FEMORAL SPONT: NORMAL
BH CV LOWER VASCULAR RIGHT DISTAL FEMORAL COMPRESS: NORMAL
BH CV LOWER VASCULAR RIGHT GASTRONEMIUS COMPRESS: NORMAL
BH CV LOWER VASCULAR RIGHT GREATER SAPH AK COMPRESS: NORMAL
BH CV LOWER VASCULAR RIGHT GREATER SAPH BK COMPRESS: NORMAL
BH CV LOWER VASCULAR RIGHT LESSER SAPH COMPRESS: NORMAL
BH CV LOWER VASCULAR RIGHT MID FEMORAL AUGMENT: NORMAL
BH CV LOWER VASCULAR RIGHT MID FEMORAL COMPETENT: NORMAL
BH CV LOWER VASCULAR RIGHT MID FEMORAL COMPRESS: NORMAL
BH CV LOWER VASCULAR RIGHT MID FEMORAL PHASIC: NORMAL
BH CV LOWER VASCULAR RIGHT MID FEMORAL SPONT: NORMAL
BH CV LOWER VASCULAR RIGHT PERONEAL COMPRESS: NORMAL
BH CV LOWER VASCULAR RIGHT POPLITEAL AUGMENT: NORMAL
BH CV LOWER VASCULAR RIGHT POPLITEAL COMPETENT: NORMAL
BH CV LOWER VASCULAR RIGHT POPLITEAL COMPRESS: NORMAL
BH CV LOWER VASCULAR RIGHT POPLITEAL PHASIC: NORMAL
BH CV LOWER VASCULAR RIGHT POPLITEAL SPONT: NORMAL
BH CV LOWER VASCULAR RIGHT POSTERIOR TIBIAL COMPRESS: NORMAL
BH CV LOWER VASCULAR RIGHT PROFUNDA FEMORAL COMPRESS: NORMAL
BH CV LOWER VASCULAR RIGHT PROXIMAL FEMORAL COMPRESS: NORMAL
BH CV LOWER VASCULAR RIGHT SAPHENOFEMORAL JUNCTION COMPRESS: NORMAL
MAXIMAL PREDICTED HEART RATE: 171 BPM
STRESS TARGET HR: 145 BPM

## 2022-05-31 PROCEDURE — 93971 EXTREMITY STUDY: CPT

## 2022-12-21 ENCOUNTER — DOCUMENTATION (OUTPATIENT)
Dept: GASTROENTEROLOGY | Facility: CLINIC | Age: 47
End: 2022-12-21

## 2023-01-12 ENCOUNTER — TELEPHONE (OUTPATIENT)
Dept: GASTROENTEROLOGY | Facility: CLINIC | Age: 48
End: 2023-01-12
Payer: COMMERCIAL

## 2023-01-12 NOTE — TELEPHONE ENCOUNTER
Patient called. Advised he needs his yearly visit for medication refills. He is requesting a refill for Sucralfate.   Advised there has been a national shortage of Sucralfate. Advised to have a discussion with Deidre regarding his medications and symptoms. He states his symptoms have not change and continue despte medication.     F/u with Deidre schedule for 01/13@1:30p.

## 2023-01-12 NOTE — TELEPHONE ENCOUNTER
Caller: Raimundo Allison    Relationship: Self    Best call back number: [phone]    What is the best time to reach you: ANYTIME    Who are you requesting to speak with (clinical staff, provider,  specific staff member): CLINICAL      What was the call regarding: PATIENT'S SPOUSE CALLED AND REQUESTED A RX CALLED SUCRALFATE WHICH I DIDM'T SEE IN HIS MED HX. HE IS COMPLETELY OUT AND HIS WIFE STATED THAT HE IS SEEN FOR THIS RX EVERY THREE YRS. [facility]ARM[facility]Y IS  [facility] [address] [place] [address] [place]. [address][phone]    Do you require a callback: IF THERE'S ANY QUESTIONS

## 2023-01-13 ENCOUNTER — OFFICE VISIT (OUTPATIENT)
Dept: GASTROENTEROLOGY | Facility: CLINIC | Age: 48
End: 2023-01-13
Payer: COMMERCIAL

## 2023-01-13 VITALS
HEIGHT: 71 IN | BODY MASS INDEX: 24.42 KG/M2 | SYSTOLIC BLOOD PRESSURE: 120 MMHG | OXYGEN SATURATION: 97 % | TEMPERATURE: 97.3 F | WEIGHT: 174.4 LBS | HEART RATE: 87 BPM | DIASTOLIC BLOOD PRESSURE: 82 MMHG

## 2023-01-13 DIAGNOSIS — K82.8 BILIARY DYSKINESIA: ICD-10-CM

## 2023-01-13 DIAGNOSIS — K22.70 BARRETT'S ESOPHAGUS WITHOUT DYSPLASIA: ICD-10-CM

## 2023-01-13 DIAGNOSIS — R13.10 ODYNOPHAGIA: Primary | ICD-10-CM

## 2023-01-13 DIAGNOSIS — K21.9 GASTROESOPHAGEAL REFLUX DISEASE, UNSPECIFIED WHETHER ESOPHAGITIS PRESENT: ICD-10-CM

## 2023-01-13 DIAGNOSIS — K58.0 IRRITABLE BOWEL SYNDROME WITH DIARRHEA: ICD-10-CM

## 2023-01-13 PROCEDURE — 99214 OFFICE O/P EST MOD 30 MIN: CPT | Performed by: NURSE PRACTITIONER

## 2023-01-13 RX ORDER — ESOMEPRAZOLE MAGNESIUM 40 MG/1
40 CAPSULE, DELAYED RELEASE ORAL 2 TIMES DAILY
Qty: 180 CAPSULE | Refills: 3 | Status: SHIPPED | OUTPATIENT
Start: 2023-01-13

## 2023-01-13 NOTE — PROGRESS NOTES
Chief Complaint   Patient presents with   • Odynophagia       HPI    Raimundo Allison is a  47 y.o. male here for a follow up visit for odynophagia.    This patient follows with Dr. Awan myself.    We have followed him for a myriad of GI issues.  He has had a normal HIDA scan and has been seen by surgical services offered surgical intervention but could not guarantee that all of his symptoms would resolve.    We also follow him for history of lymphocytic colitis, GERD, IBS-D.  Elavil caused dry mouth  Levsin as needed seem to work too well  He was prescribed Xifaxan on last office visit but it was too expensive    He has been on Nexium 40 mg p.o. every day to manage GERD  He has a history of Willoughby's esophagus long segment with plans for follow-up EGD 2023  Due for screening colonoscopy 2025    His primary complaint today is odynophagia coming a daily issue drinking water for relief.  He takes Nexium in the evening.  No nausea, vomiting, poor appetite, weight loss.  He follows an antireflux diet.  He does admit to alcohol the weekends and uses nicotine chewables throughout the week.    Still complains of lower digestive discomfort in the morning manifesting as bloating and cramping.  Symptoms improve as the day goes on.  Bowel movements are regular without issue.  No diarrhea, constipation, or rectal bleeding.    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)    • Bronchitis    • Chest pain    • Costochondritis    • Factor 5 Leiden mutation, heterozygous (HCC)    • Family history of heart disease 10/13/2017    Father   • GERD (gastroesophageal reflux disease)    • GERD (gastroesophageal reflux disease)    • High cholesterol    • Hyperlipidemia    • Irritable bowel syndrome        Past Surgical History:   Procedure Laterality Date   • COLONOSCOPY N/A 09/23/2016    TVA w/low grade dysplasia, focal ulceration   • SHOULDER ACROMIOPLASTY Bilateral    • SHOULDER ARTHROSCOPY       ALISON Holland MD   • UPPER GASTROINTESTINAL ENDOSCOPY         Scheduled Meds:     Continuous Infusions: No current facility-administered medications for this visit.      PRN Meds:     No Known Allergies    Social History     Socioeconomic History   • Marital status:    Tobacco Use   • Smoking status: Former     Types: Cigars, Cigarettes     Quit date: 2016     Years since quittin.0   • Smokeless tobacco: Current     Types: Snuff   Vaping Use   • Vaping Use: Never used   Substance and Sexual Activity   • Alcohol use: Yes     Alcohol/week: 16.0 standard drinks     Types: 4 Cans of beer, 12 Standard drinks or equivalent per week     Comment: daily   • Drug use: No   • Sexual activity: Defer       Family History   Problem Relation Age of Onset   • Colon polyps Mother    • Factor V Leiden deficiency Father    • Heart attack Father    • Factor V Leiden deficiency Sister    • Factor V Leiden deficiency Brother    • Colon cancer Maternal Uncle         late 50s       Review of Systems   Constitutional: Negative for activity change, appetite change, fatigue, fever and unexpected weight change.   HENT: Negative for trouble swallowing.         Odynophagia   Respiratory: Negative for apnea, cough, choking, chest tightness, shortness of breath and wheezing.    Cardiovascular: Negative for chest pain, palpitations and leg swelling.   Gastrointestinal: Negative for abdominal distention, abdominal pain, anal bleeding, blood in stool, constipation, diarrhea, nausea, rectal pain and vomiting.       Vitals:    23 1348   BP: 120/82   Pulse: 87   Temp: 97.3 °F (36.3 °C)   SpO2: 97%       Physical Exam  Constitutional:       Appearance: He is well-developed.   Abdominal:      General: Bowel sounds are normal. There is no distension.      Palpations: Abdomen is soft. There is no mass.      Tenderness: There is no abdominal tenderness. There is no guarding.      Hernia: No hernia is present.   Skin:     General:  Skin is warm and dry.      Capillary Refill: Capillary refill takes less than 2 seconds.   Neurological:      Mental Status: He is alert and oriented to person, place, and time.   Psychiatric:         Behavior: Behavior normal.     Assessment    Diagnoses and all orders for this visit:    1. Odynophagia (Primary)  -     Case Request; Standing  -     Obtain Informed Consent; Standing  -     Case Request    2. Willoughby's esophagus without dysplasia  -     Case Request; Standing  -     Obtain Informed Consent; Standing  -     Case Request    3. Gastroesophageal reflux disease, unspecified whether esophagitis present  -     Case Request; Standing  -     Obtain Informed Consent; Standing  -     Case Request    4. Irritable bowel syndrome with diarrhea    5. Biliary dyskinesia    Other orders  -     esomeprazole (nexIUM) 40 MG capsule; Take 1 capsule by mouth 2 (Two) Times a Day.  Dispense: 180 capsule; Refill: 3  -     riFAXIMin (Xifaxan) 550 MG tablet; Take 1 tablet by mouth 3 (Three) Times a Day for 14 days.  Dispense: 42 tablet; Refill: 0       Plan    EGD for symptoms of odynophagia and surveillance for Willoughby's esophagus  Increase Nexium to twice daily dosing  Give course of Xifaxan as previously recommended via samples I provided today  Take IBgard 2 tablets prior to bed  Further recommendations and follow-up pending endoscopic examination         WING Motley  [facility]  [address] [place] [address]  Office: [phone]

## 2023-01-30 ENCOUNTER — TELEPHONE (OUTPATIENT)
Dept: GASTROENTEROLOGY | Facility: CLINIC | Age: 48
End: 2023-01-30

## 2023-01-30 NOTE — TELEPHONE ENCOUNTER
Caller: Barbara Allison    Relationship to patient: Emergency Contact    Best call back number: [phone]    Patient is needing: PATIENT CALLING TO CANCEL SCOPE. THEY HAVE ALERTED THE SX CENTER, AND WILL CALL WHEN THEY ARE READY TO RESCHEDULE. I WAS UNABLE TO WARM TRANSFER.

## 2023-09-14 ENCOUNTER — TELEPHONE (OUTPATIENT)
Dept: GASTROENTEROLOGY | Facility: CLINIC | Age: 48
End: 2023-09-14
Payer: COMMERCIAL

## 2023-09-14 PROBLEM — K22.70 BARRETT'S ESOPHAGUS WITHOUT DYSPLASIA: Status: ACTIVE | Noted: 2023-09-14

## 2023-09-14 PROBLEM — R13.10 ODYNOPHAGIA: Status: ACTIVE | Noted: 2023-09-14

## 2023-10-09 RX ORDER — ATORVASTATIN CALCIUM 10 MG/1
10 TABLET, FILM COATED ORAL DAILY
COMMUNITY

## 2023-10-10 ENCOUNTER — HOSPITAL ENCOUNTER (OUTPATIENT)
Facility: HOSPITAL | Age: 48
Setting detail: HOSPITAL OUTPATIENT SURGERY
Discharge: HOME | End: 2023-10-10
Attending: INTERNAL MEDICINE | Admitting: INTERNAL MEDICINE
Payer: COMMERCIAL

## 2023-10-10 ENCOUNTER — ANESTHESIA EVENT (OUTPATIENT)
Dept: GASTROENTEROLOGY | Facility: HOSPITAL | Age: 48
End: 2023-10-10
Payer: COMMERCIAL

## 2023-10-10 ENCOUNTER — ANESTHESIA (OUTPATIENT)
Dept: GASTROENTEROLOGY | Facility: HOSPITAL | Age: 48
End: 2023-10-10
Payer: COMMERCIAL

## 2023-10-10 VITALS
WEIGHT: 178.5 LBS | SYSTOLIC BLOOD PRESSURE: 129 MMHG | TEMPERATURE: 97.9 F | OXYGEN SATURATION: 98 % | HEIGHT: 71 IN | BODY MASS INDEX: 24.99 KG/M2 | HEART RATE: 84 BPM | RESPIRATION RATE: 20 BRPM | DIASTOLIC BLOOD PRESSURE: 74 MMHG

## 2023-10-10 DIAGNOSIS — R13.10 ODYNOPHAGIA: ICD-10-CM

## 2023-10-10 DIAGNOSIS — K21.9 GASTROESOPHAGEAL REFLUX DISEASE, UNSPECIFIED WHETHER ESOPHAGITIS PRESENT: ICD-10-CM

## 2023-10-10 DIAGNOSIS — K22.70 BARRETT'S ESOPHAGUS WITHOUT DYSPLASIA: ICD-10-CM

## 2023-10-10 PROCEDURE — 25010000002 PROPOFOL 10 MG/ML EMULSION: Performed by: ANESTHESIOLOGY

## 2023-10-10 PROCEDURE — S0260 H&P FOR SURGERY: HCPCS | Performed by: INTERNAL MEDICINE

## 2023-10-10 PROCEDURE — 25810000003 LACTATED RINGERS PER 1000 ML: Performed by: INTERNAL MEDICINE

## 2023-10-10 PROCEDURE — 88305 TISSUE EXAM BY PATHOLOGIST: CPT | Performed by: INTERNAL MEDICINE

## 2023-10-10 RX ORDER — SODIUM CHLORIDE, SODIUM LACTATE, POTASSIUM CHLORIDE, CALCIUM CHLORIDE 600; 310; 30; 20 MG/100ML; MG/100ML; MG/100ML; MG/100ML
30 INJECTION, SOLUTION INTRAVENOUS CONTINUOUS PRN
Status: DISCONTINUED | OUTPATIENT
Start: 2023-10-10 | End: 2023-10-10 | Stop reason: HOSPADM

## 2023-10-10 RX ORDER — LIDOCAINE HYDROCHLORIDE 20 MG/ML
INJECTION, SOLUTION INFILTRATION; PERINEURAL AS NEEDED
Status: DISCONTINUED | OUTPATIENT
Start: 2023-10-10 | End: 2023-10-10 | Stop reason: SURG

## 2023-10-10 RX ORDER — PROPOFOL 10 MG/ML
VIAL (ML) INTRAVENOUS AS NEEDED
Status: DISCONTINUED | OUTPATIENT
Start: 2023-10-10 | End: 2023-10-10 | Stop reason: SURG

## 2023-10-10 RX ADMIN — PROPOFOL 200 MCG/KG/MIN: 10 INJECTION, EMULSION INTRAVENOUS at 12:20

## 2023-10-10 RX ADMIN — PROPOFOL 200 MG: 10 INJECTION, EMULSION INTRAVENOUS at 12:20

## 2023-10-10 RX ADMIN — LIDOCAINE HYDROCHLORIDE 60 MG: 20 INJECTION, SOLUTION INFILTRATION; PERINEURAL at 12:20

## 2023-10-10 RX ADMIN — SODIUM CHLORIDE, POTASSIUM CHLORIDE, SODIUM LACTATE AND CALCIUM CHLORIDE 30 ML/HR: 600; 310; 30; 20 INJECTION, SOLUTION INTRAVENOUS at 11:48

## 2023-10-10 NOTE — H&P
"[facility]  Pre Procedure History & Physical    Chief Complaint:   Time for my egd    Subjective     HPI:   48 y.o. male presenting to endoscopy unit today egd     Past Medical History:   Past Medical History:   Diagnosis Date    AC globulin factor V (labile) deficiency     Bronchitis     Chest pain     Costochondritis     Factor 5 Leiden mutation, heterozygous     Family history of heart disease 10/13/2017    Father    GERD (gastroesophageal reflux disease)     GERD (gastroesophageal reflux disease)     High cholesterol     Hyperlipidemia     Irritable bowel syndrome        Family History:  Family History   Problem Relation Age of Onset    Colon polyps Mother     Factor V Leiden deficiency Father     Heart attack Father     Factor V Leiden deficiency Sister     Factor V Leiden deficiency Brother     Colon cancer Maternal Uncle         late 50s    Malig Hyperthermia Neg Hx        Social History:   reports that he quit smoking about 7 years ago. His smoking use included cigars and cigarettes. His smokeless tobacco use includes snuff. He reports current alcohol use of about 16.0 standard drinks of alcohol per week. He reports that he does not use drugs.    Medications:   Medications Prior to Admission   Medication Sig Dispense Refill Last Dose    atorvastatin (LIPITOR) 10 MG tablet Take 1 tablet by mouth Daily.   10/9/2023    calcium polycarbophil (FIBERCON) 625 MG tablet Take 500 mg by mouth Daily.   Past Week    diazePAM (VALIUM) 5 MG tablet Take 1 tablet by mouth Daily As Needed.   10/9/2023    esomeprazole (nexIUM) 40 MG capsule Take 1 capsule by mouth 2 (Two) Times a Day. 180 capsule 3 10/9/2023       Allergies:  Patient has no known allergies.    Objective     Blood pressure 128/78, pulse 83, temperature 97.9 øF (36.6 øC), temperature source Oral, resp. rate 16, height 180.3 cm (71\"), weight 81 kg (178 lb 8 oz), SpO2 99%.  Physical Exam:   General: patient awake, alert and " cooperative    Assessment & Plan     Diagnosis:  barretts    Anticipated Surgical Procedure:  egd    The risks, benefits, and alternatives of this procedure have been discussed with the patient or the responsible party- the patient understands and agrees to proceed.                                                                  No

## 2023-10-10 NOTE — DISCHARGE INSTRUCTIONS
For the next 24 hours patient needs to be with a responsible adult.    For 24 hours DO NOT drive, operate machinery, appliances, drink alcohol, make important decisions or sign legal documents.    Start with a light or bland diet if you are feeling sick to your stomach otherwise advance to regular diet as tolerated.    Follow recommendations on procedure report if provided by your doctor.    Call Dr Awan for problems [phone].  Office will call with biopsy results in 7-14 days.    Problems may include but not limited to: large amounts of bleeding, trouble breathing, repeated vomiting, severe unrelieved pain, fever or chills.

## 2023-10-10 NOTE — ANESTHESIA PREPROCEDURE EVALUATION
Anesthesia Evaluation     Patient summary reviewed and Nursing notes reviewed   NPO Solid Status: > 8 hours  NPO Liquid Status: > 4 hours           Airway   Mallampati: II  TM distance: >3 FB  Neck ROM: full  No difficulty expected  Dental - normal exam     Pulmonary - normal exam    breath sounds clear to auscultation  (+) a smoker Former,shortness of breath  Cardiovascular - normal exam    ECG reviewed  Rhythm: regular  Rate: normal    (+) hyperlipidemia    ROS comment: EF 57% by ECHO 11/17/normal stress test 11/17    Neuro/Psych  (+) headaches  GI/Hepatic/Renal/Endo    (+) GERD    Musculoskeletal     Abdominal  - normal exam   Substance History   (+) alcohol use     OB/GYN          Other   blood dyscrasia,       Other Comment: Factor V Leiden                Anesthesia Plan    ASA 2     MAC     intravenous induction     Anesthetic plan, risks, benefits, and alternatives have been provided, discussed and informed consent has been obtained with: patient.    CODE STATUS:

## 2023-10-10 NOTE — ANESTHESIA POSTPROCEDURE EVALUATION
Patient: Raimundo Allison    Procedure Summary       Date: 10/10/23 Room / Location:  [facility] /  [facility]    Anesthesia Start: 1217 Anesthesia Stop: 1233    Procedure: ESOPHAGOGASTRODUODENOSCOPY WITH COLD BIOPSIES (Esophagus) Diagnosis:       Odynophagia      Willoughby's esophagus without dysplasia      Gastroesophageal reflux disease, unspecified whether esophagitis present      (Odynophagia [R13.10])      (Willoughby's esophagus without dysplasia [K22.70])      (Gastroesophageal reflux disease, unspecified whether esophagitis present [K21.9])    Surgeons: Grey Awan MD Provider: Scooter Pereyra MD    Anesthesia Type: MAC ASA Status: 2            Anesthesia Type: MAC    Vitals  Vitals Value Taken Time   /71 10/10/23 1242   Temp     Pulse 82 10/10/23 1242   Resp 22 10/10/23 1242   SpO2 97 % 10/10/23 1242           Post Anesthesia Care and Evaluation    Patient location during evaluation: PHASE II  Patient participation: complete - patient participated  Level of consciousness: awake and alert  Pain management: adequate    Airway patency: patent  Anesthetic complications: No anesthetic complications  PONV Status: none  Cardiovascular status: acceptable and hemodynamically stable  Respiratory status: acceptable, nonlabored ventilation and spontaneous ventilation  Hydration status: acceptable

## 2023-10-11 ENCOUNTER — TELEPHONE (OUTPATIENT)
Dept: GASTROENTEROLOGY | Facility: CLINIC | Age: 48
End: 2023-10-11
Payer: COMMERCIAL

## 2023-10-11 LAB
LAB AP CASE REPORT: NORMAL
PATH REPORT.FINAL DX SPEC: NORMAL
PATH REPORT.GROSS SPEC: NORMAL

## 2023-10-11 NOTE — TELEPHONE ENCOUNTER
Call to patient per Dr. Awan's results and recommendations.   Patient verbalized understanding       ----- Message from Grey Awan MD sent at 10/11/2023 10:58 AM EDT -----  Willoughby's no dysplasia continue PPI  Office visit extender 6 months  EGD recall 3 years

## 2025-02-22 ENCOUNTER — HOSPITAL ENCOUNTER (EMERGENCY)
Facility: HOSPITAL | Age: 50
Discharge: HOME | End: 2025-02-22
Attending: STUDENT IN AN ORGANIZED HEALTH CARE EDUCATION/TRAINING PROGRAM | Admitting: STUDENT IN AN ORGANIZED HEALTH CARE EDUCATION/TRAINING PROGRAM
Payer: COMMERCIAL

## 2025-02-22 ENCOUNTER — APPOINTMENT (OUTPATIENT)
Dept: CT IMAGING | Facility: HOSPITAL | Age: 50
End: 2025-02-22
Payer: COMMERCIAL

## 2025-02-22 VITALS
SYSTOLIC BLOOD PRESSURE: 107 MMHG | WEIGHT: 184 LBS | TEMPERATURE: 97 F | DIASTOLIC BLOOD PRESSURE: 79 MMHG | RESPIRATION RATE: 18 BRPM | HEART RATE: 92 BPM | BODY MASS INDEX: 25.76 KG/M2 | OXYGEN SATURATION: 97 % | HEIGHT: 71 IN

## 2025-02-22 DIAGNOSIS — R10.30 LOWER ABDOMINAL PAIN: Primary | ICD-10-CM

## 2025-02-22 LAB
ALBUMIN SERPL-MCNC: 4.2 G/DL (ref 3.5–5.2)
ALBUMIN/GLOB SERPL: 1.4 G/DL
ALP SERPL-CCNC: 77 U/L (ref 39–117)
ALT SERPL W P-5'-P-CCNC: 15 U/L (ref 1–41)
ANION GAP SERPL CALCULATED.3IONS-SCNC: 8.3 MMOL/L (ref 5–15)
AST SERPL-CCNC: 16 U/L (ref 1–40)
BASOPHILS # BLD AUTO: 0.03 10*3/MM3 (ref 0–0.2)
BASOPHILS NFR BLD AUTO: 0.4 % (ref 0–1.5)
BILIRUB SERPL-MCNC: 0.4 MG/DL (ref 0–1.2)
BILIRUB UR QL STRIP: NEGATIVE
BUN SERPL-MCNC: 12 MG/DL (ref 6–20)
BUN/CREAT SERPL: 10.6 (ref 7–25)
CALCIUM SPEC-SCNC: 9.1 MG/DL (ref 8.6–10.5)
CHLORIDE SERPL-SCNC: 104 MMOL/L (ref 98–107)
CLARITY UR: CLEAR
CO2 SERPL-SCNC: 25.7 MMOL/L (ref 22–29)
COLOR UR: YELLOW
CREAT SERPL-MCNC: 1.13 MG/DL (ref 0.76–1.27)
DEPRECATED RDW RBC AUTO: 41.3 FL (ref 37–54)
EGFRCR SERPLBLD CKD-EPI 2021: 78.2 ML/MIN/1.73
EOSINOPHIL # BLD AUTO: 0.34 10*3/MM3 (ref 0–0.4)
EOSINOPHIL NFR BLD AUTO: 5 % (ref 0.3–6.2)
ERYTHROCYTE [DISTWIDTH] IN BLOOD BY AUTOMATED COUNT: 11.8 % (ref 12.3–15.4)
GLOBULIN UR ELPH-MCNC: 2.9 GM/DL
GLUCOSE SERPL-MCNC: 92 MG/DL (ref 65–99)
GLUCOSE UR STRIP-MCNC: NEGATIVE MG/DL
HCT VFR BLD AUTO: 44.3 % (ref 37.5–51)
HGB BLD-MCNC: 14.9 G/DL (ref 13–17.7)
HGB UR QL STRIP.AUTO: NEGATIVE
IMM GRANULOCYTES # BLD AUTO: 0.02 10*3/MM3 (ref 0–0.05)
IMM GRANULOCYTES NFR BLD AUTO: 0.3 % (ref 0–0.5)
KETONES UR QL STRIP: NEGATIVE
LEUKOCYTE ESTERASE UR QL STRIP.AUTO: NEGATIVE
LIPASE SERPL-CCNC: 30 U/L (ref 13–60)
LYMPHOCYTES # BLD AUTO: 1.79 10*3/MM3 (ref 0.7–3.1)
LYMPHOCYTES NFR BLD AUTO: 26.4 % (ref 19.6–45.3)
MCH RBC QN AUTO: 31.3 PG (ref 26.6–33)
MCHC RBC AUTO-ENTMCNC: 33.6 G/DL (ref 31.5–35.7)
MCV RBC AUTO: 93.1 FL (ref 79–97)
MONOCYTES # BLD AUTO: 0.58 10*3/MM3 (ref 0.1–0.9)
MONOCYTES NFR BLD AUTO: 8.6 % (ref 5–12)
NEUTROPHILS NFR BLD AUTO: 4.02 10*3/MM3 (ref 1.7–7)
NEUTROPHILS NFR BLD AUTO: 59.3 % (ref 42.7–76)
NITRITE UR QL STRIP: NEGATIVE
PH UR STRIP.AUTO: 6.5 [PH] (ref 5–8)
PLATELET # BLD AUTO: 303 10*3/MM3 (ref 140–450)
PMV BLD AUTO: 10.1 FL (ref 6–12)
POTASSIUM SERPL-SCNC: 3.9 MMOL/L (ref 3.5–5.2)
PROT SERPL-MCNC: 7.1 G/DL (ref 6–8.5)
PROT UR QL STRIP: ABNORMAL
RBC # BLD AUTO: 4.76 10*6/MM3 (ref 4.14–5.8)
SODIUM SERPL-SCNC: 138 MMOL/L (ref 136–145)
SP GR UR STRIP: 1.02 (ref 1–1.03)
UROBILINOGEN UR QL STRIP: ABNORMAL
WBC NRBC COR # BLD AUTO: 6.78 10*3/MM3 (ref 3.4–10.8)

## 2025-02-22 PROCEDURE — 80053 COMPREHEN METABOLIC PANEL: CPT

## 2025-02-22 PROCEDURE — 96375 TX/PRO/DX INJ NEW DRUG ADDON: CPT

## 2025-02-22 PROCEDURE — 25510000001 IOPAMIDOL PER 1 ML: Performed by: STUDENT IN AN ORGANIZED HEALTH CARE EDUCATION/TRAINING PROGRAM

## 2025-02-22 PROCEDURE — 85025 COMPLETE CBC W/AUTO DIFF WBC: CPT

## 2025-02-22 PROCEDURE — 25010000002 MORPHINE PER 10 MG: Performed by: STUDENT IN AN ORGANIZED HEALTH CARE EDUCATION/TRAINING PROGRAM

## 2025-02-22 PROCEDURE — 83690 ASSAY OF LIPASE: CPT

## 2025-02-22 PROCEDURE — 99285 EMERGENCY DEPT VISIT HI MDM: CPT

## 2025-02-22 PROCEDURE — 25010000002 ONDANSETRON PER 1 MG: Performed by: STUDENT IN AN ORGANIZED HEALTH CARE EDUCATION/TRAINING PROGRAM

## 2025-02-22 PROCEDURE — 96374 THER/PROPH/DIAG INJ IV PUSH: CPT

## 2025-02-22 PROCEDURE — 74177 CT ABD & PELVIS W/CONTRAST: CPT

## 2025-02-22 PROCEDURE — 96376 TX/PRO/DX INJ SAME DRUG ADON: CPT

## 2025-02-22 PROCEDURE — 36415 COLL VENOUS BLD VENIPUNCTURE: CPT

## 2025-02-22 PROCEDURE — 81003 URINALYSIS AUTO W/O SCOPE: CPT

## 2025-02-22 RX ORDER — SODIUM CHLORIDE 0.9 % (FLUSH) 0.9 %
10 SYRINGE (ML) INJECTION AS NEEDED
Status: DISCONTINUED | OUTPATIENT
Start: 2025-02-22 | End: 2025-02-22 | Stop reason: HOSPADM

## 2025-02-22 RX ORDER — TRAMADOL HYDROCHLORIDE 50 MG/1
50 TABLET ORAL EVERY 6 HOURS PRN
Qty: 12 TABLET | Refills: 0 | Status: SHIPPED | OUTPATIENT
Start: 2025-02-22

## 2025-02-22 RX ORDER — MORPHINE SULFATE 2 MG/ML
2 INJECTION, SOLUTION INTRAMUSCULAR; INTRAVENOUS ONCE
Status: COMPLETED | OUTPATIENT
Start: 2025-02-22 | End: 2025-02-22

## 2025-02-22 RX ORDER — ONDANSETRON 2 MG/ML
4 INJECTION INTRAMUSCULAR; INTRAVENOUS ONCE
Status: COMPLETED | OUTPATIENT
Start: 2025-02-22 | End: 2025-02-22

## 2025-02-22 RX ORDER — IOPAMIDOL 755 MG/ML
100 INJECTION, SOLUTION INTRAVASCULAR
Status: COMPLETED | OUTPATIENT
Start: 2025-02-22 | End: 2025-02-22

## 2025-02-22 RX ADMIN — MORPHINE SULFATE 2 MG: 2 INJECTION, SOLUTION INTRAMUSCULAR; INTRAVENOUS at 20:16

## 2025-02-22 RX ADMIN — Medication 10 ML: at 18:34

## 2025-02-22 RX ADMIN — MORPHINE SULFATE 2 MG: 2 INJECTION, SOLUTION INTRAMUSCULAR; INTRAVENOUS at 18:31

## 2025-02-22 RX ADMIN — IOPAMIDOL 100 ML: 755 INJECTION, SOLUTION INTRAVENOUS at 19:38

## 2025-02-22 RX ADMIN — ONDANSETRON 4 MG: 2 INJECTION INTRAMUSCULAR; INTRAVENOUS at 18:34

## 2025-02-22 NOTE — ED NOTES
Right lower quadrant pain, bilateral back pain.  Pt has extensive hx with GI, Barrets and other IBS issues.  Pain has changed in last week lower mid back swollen with the RLQ pain.  Pt still has gallbladder and appendix also.  Pt A&O x4 denies any SOB, CP or cold like symptoms.  IV done, labs sent, urine also sent to lab

## 2025-02-23 NOTE — FSED PROVIDER NOTE
Subjective   History of Present Illness  Patient is a 50-year-old male with history of IBS who presents to the emergency department for lower abdominal pain x 2 weeks.  Patient describes a burning aching feeling, worse on the right side.  Over the past few days, has been radiating to the bilateral low back.  Denies urinary symptoms, fever, chest pain, shortness of breath.  Patient has chronic history of IBS, alternating diarrhea and constipation, but reports no changes.  Followed by GI.        Review of Systems   Constitutional:  Negative for appetite change, chills, diaphoresis, fatigue and fever.   HENT:  Negative for congestion.    Respiratory:  Negative for shortness of breath.    Cardiovascular:  Negative for chest pain.   Gastrointestinal:  Positive for abdominal pain, constipation (Chronic) and diarrhea (Chronic). Negative for nausea and vomiting.   Genitourinary:  Negative for difficulty urinating, dysuria and flank pain.   Musculoskeletal:  Positive for back pain.   Skin:  Negative for color change, pallor, rash and wound.       Past Medical History:   Diagnosis Date    AC globulin factor V (labile) deficiency     Bronchitis     Chest pain     Costochondritis     Factor 5 Leiden mutation, heterozygous     Family history of heart disease 10/13/2017    Father    GERD (gastroesophageal reflux disease)     GERD (gastroesophageal reflux disease)     High cholesterol     Hyperlipidemia     Irritable bowel syndrome        No Known Allergies    Past Surgical History:   Procedure Laterality Date    COLONOSCOPY N/A 09/23/2016    TVA w/low grade dysplasia, focal ulceration    ENDOSCOPY N/A 10/10/2023    Procedure: ESOPHAGOGASTRODUODENOSCOPY WITH COLD BIOPSIES;  Surgeon: Grey Awan MD;  Location: [facility] ENDOSCOPY;  Service: Gastroenterology;  Laterality: N/A;  PRE: LAWTON'S ESOPHAGUS /  POST: SAME    SHOULDER ACROMIOPLASTY Bilateral     SHOULDER ARTHROSCOPY  2015    ALISON Holland MD    UPPER GASTROINTESTINAL  ENDOSCOPY         Family History   Problem Relation Age of Onset    Colon polyps Mother     Factor V Leiden deficiency Father     Heart attack Father     Factor V Leiden deficiency Sister     Factor V Leiden deficiency Brother     Colon cancer Maternal Uncle         late 50s    Malig Hyperthermia Neg Hx        Social History     Socioeconomic History    Marital status:    Tobacco Use    Smoking status: Former     Current packs/day: 0.00     Types: Cigars, Cigarettes     Quit date:      Years since quittin.1    Smokeless tobacco: Current     Types: Snuff   Vaping Use    Vaping status: Never Used   Substance and Sexual Activity    Alcohol use: Yes     Alcohol/week: 16.0 standard drinks of alcohol     Types: 4 Cans of beer, 12 Standard drinks or equivalent per week     Comment: daily    Drug use: No    Sexual activity: Defer           Objective   Physical Exam  Constitutional:       General: He is not in acute distress.     Appearance: He is normal weight. He is not ill-appearing, toxic-appearing or diaphoretic.   Cardiovascular:      Rate and Rhythm: Normal rate and regular rhythm.   Pulmonary:      Effort: Pulmonary effort is normal.   Abdominal:      General: Abdomen is flat. There is no distension.      Palpations: Abdomen is soft. There is no mass.      Tenderness: There is no abdominal tenderness. There is no guarding or rebound.      Hernia: No hernia is present.   Neurological:      General: No focal deficit present.      Mental Status: He is alert.   Psychiatric:         Mood and Affect: Mood normal.         Behavior: Behavior normal.         Procedures           ED Course  ED Course as of 25   Sat 2025   1849 CBC and CMP unremarkable.  Normal lipase. [AS]    Urinalysis with trace protein only. [AS]    CT abdomen pelvis with contrast:    IMPRESSION:     1. No acute inflammatory process of bowel is identified. Follow-up as  indications persist.     2. No obstructive  uropathy.     3. Small cholelithiasis versus gallbladder wall calcification.   [AS]      ED Course User Index  [AS] Suzi Beltrán, AVELINA                                           Medical Decision Making  Patient is a 50-year-old male who presents for lower abdominal pain x 2 weeks.  This is acute, as opposed to his chronic abdominal pain from IBS.  He overall appears well, no acute stress, nontoxic.  Vital signs WNL.  Exam is unremarkable.  Abdomen soft, nontender, nonsurgical.  Lab work and urine unremarkable.  CT without acute abnormality.  Discussed symptomatic measures and close follow-up.  Strict return precautions given.  Discussed when to return to the emergency department.  Answered all questions.  Patient verbalized understanding and was agreeable to plan and discharge.    My differential diagnosis for abdominal pain includes but is not limited to:  Gastritis, gastroenteritis, peptic ulcer disease, GERD, esophageal perforation, acute appendicitis, mesenteric adenitis, Meckel's diverticulum, epiploic appendagitis, diverticulitis, colon cancer, ulcerative colitis, Crohn's disease, intussusception, small bowel obstruction, adhesions, ischemic bowel, perforated viscus, ileus, obstipation, biliary colic, cholecystitis, cholelithiasis, Shahid-Neto Shorty, hepatitis, pancreatitis, common bile duct obstruction, cholangitis, bile leak, splenic trauma, splenic rupture, splenic infarction, splenic abscess, abdominal abscess, ascites, spontaneous bacterial peritonitis, hernia, UTI, cystitis, prostatitis,ureterolithiasis, urinary obstruction, AAA, myocardial infarction, pneumonia, cancer, porphyria, DKA, medications, sickle cell, viral syndrome, zoster     Amount and/or Complexity of Data Reviewed  Labs: ordered.  Radiology: ordered.    Risk  Prescription drug management.        Final diagnoses:   Lower abdominal pain       ED Disposition  ED Disposition       ED Disposition   Discharge    Condition   Stable    Comment   --                Keegan Grant MD  [address]  [facility] [address]  [place] [id number] [phone]          Provider, No Known  [facility] [id number]               Medication List        New Prescriptions      traMADol 50 MG tablet  Commonly known as: ULTRAM  Take 1 tablet by mouth Every 6 (Six) Hours As Needed for Moderate Pain or Severe Pain.               Where to Get Your Medications        These medications were sent to [facility] [id number] - [place], [place] - [address] AT [facility] - [phone]  - [phone]   [address], [facility] [address]      Phone: [phone]   traMADol 50 MG tablet

## 2025-02-23 NOTE — ED NOTES
PT INFORMED THAT VISIT TODAY WILL BE TRANSITIONED FROM URGENT CARE TO EMERGENCY ROOM CARE AND BILLING. ER ACKNOWLEDGEMENT FORM SIGNED AND ALL QUESTIONS ANSWERED

## 2025-02-23 NOTE — DISCHARGE INSTRUCTIONS
Recommend close follow-up with PCP for ongoing evaluation and management.  Also recommend follow-up with GI and/or general surgery for next steps.  Use Tylenol and tramadol as prescribed as needed for pain.  Tramadol is a narcotic.  Can cause drowsiness.  Do not drive.  Do not coadministered with your diazepam.  Continue to monitor symptoms closely as discussed, and return to emergency department for worsening symptoms or other medical emergencies.  Refer to the attached instructions for further information.

## 2025-04-04 ENCOUNTER — OFFICE VISIT (OUTPATIENT)
Dept: GASTROENTEROLOGY | Facility: CLINIC | Age: 50
End: 2025-04-04
Payer: COMMERCIAL

## 2025-04-04 VITALS
WEIGHT: 180.3 LBS | HEART RATE: 81 BPM | SYSTOLIC BLOOD PRESSURE: 133 MMHG | TEMPERATURE: 97.5 F | HEIGHT: 71 IN | DIASTOLIC BLOOD PRESSURE: 85 MMHG | BODY MASS INDEX: 25.24 KG/M2

## 2025-04-04 DIAGNOSIS — K21.9 GASTROESOPHAGEAL REFLUX DISEASE, UNSPECIFIED WHETHER ESOPHAGITIS PRESENT: ICD-10-CM

## 2025-04-04 DIAGNOSIS — K58.2 IRRITABLE BOWEL SYNDROME WITH BOTH CONSTIPATION AND DIARRHEA: ICD-10-CM

## 2025-04-04 DIAGNOSIS — K22.70 BARRETT'S ESOPHAGUS WITHOUT DYSPLASIA: Primary | ICD-10-CM

## 2025-04-04 PROCEDURE — 99214 OFFICE O/P EST MOD 30 MIN: CPT | Performed by: PHYSICIAN ASSISTANT

## 2025-04-04 RX ORDER — ESOMEPRAZOLE MAGNESIUM 40 MG/1
40 CAPSULE, DELAYED RELEASE ORAL 2 TIMES DAILY
Qty: 180 CAPSULE | Refills: 3 | Status: SHIPPED | OUTPATIENT
Start: 2025-04-04

## 2025-04-04 NOTE — PROGRESS NOTES
"Chief Complaint  Abdominal Pain, Constipation, Diarrhea, and Heartburn    Subjective          History Of Present Illness:    Raimundo Allison is a  50 y.o. male patient of Dr. Awan who presents as a follow-up for IBS, GERD, Willoughby's esophagus    Patient has a history of a multitude of GI conditions including lymphocytic lymphocytic colitis, GERD, IBS-D.  He has previously tried Elavil but it caused him a dry mouth.    Patient's symptoms for the most part are unchanged.  He continues to have alternating diarrhea and constipation which is often worse in the mornings.  He has recently developed right lower quadrant abdominal pain.  He did undergo CT abdomen pelvis on 2/22/2025 when he presented to the emergency room which showed no abnormalities of the bowel, mesenteric arteries, liver, spleen, pancreas.  Patient does have small cholelithiasis.  He has previously had his gallbladder evaluated and offered cholecystectomy but ultimately declined.  He denies any melena or hematochezia.  GERD is currently well-controlled on esomeprazole twice daily. No dysphagia.    Additional data reviewed:  Colonoscopy 2/19/2020 -normal TI, tattoo within the sigmoid colon, otherwise normal.  Recall 5 years.  EGD 10/10/23 -esophageal mucosal changes suggestive of long segment Willoughby's esophagus, normal stomach and duodenum.  Recall 3 years.    Objective   Vital Signs:   /85   Pulse 81   Temp 97.5 °F (36.4 °C)   Ht 180.3 cm (71\")   Wt 81.8 kg (180 lb 4.8 oz)   BMI 25.15 kg/m²       Physical Exam  Vitals reviewed.   Constitutional:       General: He is not in acute distress.     Appearance: Normal appearance. He is not ill-appearing.   HENT:      Head: Normocephalic and atraumatic.      Nose: Nose normal.      Mouth/Throat:      Pharynx: Oropharynx is clear.   Eyes:      Conjunctiva/sclera: Conjunctivae normal.   Pulmonary:      Effort: Pulmonary effort is normal.   Abdominal:      General: There is no distension.      " Palpations: Abdomen is soft. There is no mass.      Tenderness: There is no abdominal tenderness.   Musculoskeletal:         General: No swelling. Normal range of motion.      Cervical back: Normal range of motion.   Skin:     General: Skin is warm and dry.      Findings: No bruising or rash.   Neurological:      General: No focal deficit present.      Mental Status: He is alert and oriented to person, place, and time.      Motor: No weakness.      Gait: Gait normal.   Psychiatric:         Mood and Affect: Mood normal.          Result Review :   The following data was reviewed by: Liana Covington PA-C on 04/04/2025:  CMP          2/22/2025    18:24   CMP   Glucose 92    BUN 12    Creatinine 1.13    EGFR 78.2    Sodium 138    Potassium 3.9    Chloride 104    Calcium 9.1    Total Protein 7.1    Albumin 4.2    Globulin 2.9    Total Bilirubin 0.4    Alkaline Phosphatase 77    AST (SGOT) 16    ALT (SGPT) 15    Albumin/Globulin Ratio 1.4    BUN/Creatinine Ratio 10.6    Anion Gap 8.3      CBC          2/22/2025    18:24   CBC   WBC 6.78    RBC 4.76    Hemoglobin 14.9    Hematocrit 44.3    MCV 93.1    MCH 31.3    MCHC 33.6    RDW 11.8    Platelets 303            Assessment and Plan    Diagnoses and all orders for this visit:    1. Willoughby's esophagus without dysplasia (Primary)  Overview:  Added automatically from request for surgery [id number]    Orders:  -     esomeprazole (nexIUM) 40 MG capsule; Take 1 capsule by mouth 2 (Two) Times a Day.  Dispense: 180 capsule; Refill: 3    2. Gastroesophageal reflux disease, unspecified whether esophagitis present  -     esomeprazole (nexIUM) 40 MG capsule; Take 1 capsule by mouth 2 (Two) Times a Day.  Dispense: 180 capsule; Refill: 3    3. Irritable bowel syndrome with both constipation and diarrhea       Continue esomeprazole 40 mg twice daily for GERD and Willoughby's esophagus  We did discuss IBS options.  He has previously tried TCAs with side effects.  He cannot recall Levsin  being that helpful.  He is not very interested in trying any new medication therapies.  He did benefit from Xifaxan but on days he found it to be beneficial for short period of time.  Unfortunately this medication is not covered by his insurance.  I will look out for some samples and see if we are able to provide him with a 14-day course.  EGD and colonoscopy in January for screening purposes    Follow Up   Return in about 1 year (around 4/4/2026) for Liana Brunson PA-C.    Dragon dictation used throughout this note.            Liana Brunson PA-C   [facility]  [address] [place] [address]  Office: [phone]

## 2025-04-04 NOTE — Clinical Note
If any samples of Xifaxan for IBS come in next week can we please set aside a 14-day course for this patient.

## 2025-04-08 ENCOUNTER — TELEPHONE (OUTPATIENT)
Dept: GASTROENTEROLOGY | Facility: CLINIC | Age: 50
End: 2025-04-08
Payer: COMMERCIAL

## 2025-04-08 NOTE — TELEPHONE ENCOUNTER
----- Message from Liana Brunson sent at 4/4/2025  3:32 PM EDT -----  If any samples of Xifaxan for IBS come in next week can we please set aside a 14-day course for this patient.

## 2025-04-08 NOTE — TELEPHONE ENCOUNTER
Called pt and left detailed msg on vm advising of WESLEY Gaines's note.  Advised to call back if any questions.     Placed samples at  for pt.

## (undated) DEVICE — SENSR O2 OXIMAX FNGR A/ 18IN NONSTR

## (undated) DEVICE — KT ORCA ORCAPOD DISP STRL

## (undated) DEVICE — LN SMPL CO2 SHTRM SD STREAM W/M LUER

## (undated) DEVICE — ADAPT CLN BIOGUARD AIR/H2O DISP

## (undated) DEVICE — CANN O2 ETCO2 FITS ALL CONN CO2 SMPL A/ 7IN DISP LF

## (undated) DEVICE — FRCP BX RADJAW4 NDL 2.8 240CM LG OG BX40

## (undated) DEVICE — BLCK/BITE BLOX W/DENTL/RIM W/STRAP 54F

## (undated) DEVICE — TUBING, SUCTION, 1/4" X 10', STRAIGHT: Brand: MEDLINE